# Patient Record
Sex: FEMALE | Race: BLACK OR AFRICAN AMERICAN | NOT HISPANIC OR LATINO
[De-identification: names, ages, dates, MRNs, and addresses within clinical notes are randomized per-mention and may not be internally consistent; named-entity substitution may affect disease eponyms.]

---

## 2017-07-05 ENCOUNTER — APPOINTMENT (OUTPATIENT)
Dept: OBGYN | Facility: CLINIC | Age: 57
End: 2017-07-05

## 2017-07-05 VITALS
DIASTOLIC BLOOD PRESSURE: 87 MMHG | HEART RATE: 88 BPM | BODY MASS INDEX: 33.44 KG/M2 | HEIGHT: 60 IN | SYSTOLIC BLOOD PRESSURE: 131 MMHG | WEIGHT: 170.31 LBS

## 2017-07-05 RX ORDER — ESTRADIOL 0.1 MG/G
0.1 CREAM VAGINAL
Qty: 60 | Refills: 4 | Status: ACTIVE | COMMUNITY
Start: 2017-07-05 | End: 1900-01-01

## 2017-07-05 RX ORDER — HYDROXYZINE HYDROCHLORIDE 50 MG/1
50 TABLET ORAL
Qty: 30 | Refills: 0 | Status: ACTIVE | COMMUNITY
Start: 2017-01-19

## 2017-07-05 RX ORDER — CLOTRIMAZOLE 10 MG/ML
1 SOLUTION TOPICAL
Qty: 30 | Refills: 0 | Status: ACTIVE | COMMUNITY
Start: 2016-07-08

## 2017-07-05 RX ORDER — LOSARTAN POTASSIUM AND HYDROCHLOROTHIAZIDE 12.5; 1 MG/1; MG/1
100-12.5 TABLET ORAL
Qty: 90 | Refills: 0 | Status: ACTIVE | COMMUNITY
Start: 2017-05-11

## 2017-07-05 RX ORDER — GABAPENTIN 600 MG/1
600 TABLET, COATED ORAL
Qty: 30 | Refills: 0 | Status: ACTIVE | COMMUNITY
Start: 2017-05-17

## 2017-07-05 RX ORDER — METHOCARBAMOL 500 MG/1
500 TABLET, FILM COATED ORAL
Qty: 60 | Refills: 0 | Status: ACTIVE | COMMUNITY
Start: 2017-05-17

## 2017-07-05 RX ORDER — PAROXETINE HYDROCHLORIDE 30 MG/1
30 TABLET, FILM COATED ORAL
Qty: 60 | Refills: 0 | Status: ACTIVE | COMMUNITY
Start: 2017-01-19

## 2017-07-05 RX ORDER — NYSTATIN 100000 [USP'U]/G
100000 CREAM TOPICAL
Qty: 60 | Refills: 0 | Status: ACTIVE | COMMUNITY
Start: 2016-12-28

## 2017-07-05 RX ORDER — RISPERIDONE 0.5 MG/1
0.5 TABLET, FILM COATED ORAL
Qty: 30 | Refills: 0 | Status: ACTIVE | COMMUNITY
Start: 2017-05-19

## 2017-07-05 RX ORDER — ZOLPIDEM TARTRATE 10 MG/1
10 TABLET ORAL
Qty: 30 | Refills: 0 | Status: ACTIVE | COMMUNITY
Start: 2017-01-19

## 2017-08-14 ENCOUNTER — OUTPATIENT (OUTPATIENT)
Dept: OUTPATIENT SERVICES | Facility: HOSPITAL | Age: 57
LOS: 1 days | End: 2017-08-14
Payer: COMMERCIAL

## 2017-08-14 VITALS
TEMPERATURE: 98 F | WEIGHT: 170.64 LBS | DIASTOLIC BLOOD PRESSURE: 96 MMHG | HEART RATE: 88 BPM | RESPIRATION RATE: 15 BRPM | SYSTOLIC BLOOD PRESSURE: 130 MMHG

## 2017-08-14 DIAGNOSIS — N62 HYPERTROPHY OF BREAST: ICD-10-CM

## 2017-08-14 DIAGNOSIS — Z98.89 OTHER SPECIFIED POSTPROCEDURAL STATES: Chronic | ICD-10-CM

## 2017-08-14 DIAGNOSIS — N64.0 FISSURE AND FISTULA OF NIPPLE: ICD-10-CM

## 2017-08-14 DIAGNOSIS — Z01.818 ENCOUNTER FOR OTHER PREPROCEDURAL EXAMINATION: ICD-10-CM

## 2017-08-14 DIAGNOSIS — M43.22 FUSION OF SPINE, CERVICAL REGION: Chronic | ICD-10-CM

## 2017-08-14 DIAGNOSIS — Z98.890 OTHER SPECIFIED POSTPROCEDURAL STATES: Chronic | ICD-10-CM

## 2017-08-14 LAB
ANION GAP SERPL CALC-SCNC: 9 MMOL/L — SIGNIFICANT CHANGE UP (ref 5–17)
BUN SERPL-MCNC: 17 MG/DL — SIGNIFICANT CHANGE UP (ref 7–23)
CALCIUM SERPL-MCNC: 9.4 MG/DL — SIGNIFICANT CHANGE UP (ref 8.5–10.1)
CHLORIDE SERPL-SCNC: 101 MMOL/L — SIGNIFICANT CHANGE UP (ref 96–108)
CO2 SERPL-SCNC: 28 MMOL/L — SIGNIFICANT CHANGE UP (ref 22–31)
CREAT SERPL-MCNC: 0.83 MG/DL — SIGNIFICANT CHANGE UP (ref 0.5–1.3)
GLUCOSE SERPL-MCNC: 116 MG/DL — HIGH (ref 70–99)
HBA1C BLD-MCNC: 6 % — HIGH (ref 4–5.6)
HCT VFR BLD CALC: 40.3 % — SIGNIFICANT CHANGE UP (ref 34.5–45)
HGB BLD-MCNC: 13.1 G/DL — SIGNIFICANT CHANGE UP (ref 11.5–15.5)
MCHC RBC-ENTMCNC: 28.2 PG — SIGNIFICANT CHANGE UP (ref 27–34)
MCHC RBC-ENTMCNC: 32.5 GM/DL — SIGNIFICANT CHANGE UP (ref 32–36)
MCV RBC AUTO: 87 FL — SIGNIFICANT CHANGE UP (ref 80–100)
PLATELET # BLD AUTO: 214 K/UL — SIGNIFICANT CHANGE UP (ref 150–400)
POTASSIUM SERPL-MCNC: 4.3 MMOL/L — SIGNIFICANT CHANGE UP (ref 3.5–5.3)
POTASSIUM SERPL-SCNC: 4.3 MMOL/L — SIGNIFICANT CHANGE UP (ref 3.5–5.3)
RBC # BLD: 4.63 M/UL — SIGNIFICANT CHANGE UP (ref 3.8–5.2)
RBC # FLD: 14.7 % — HIGH (ref 10.3–14.5)
SODIUM SERPL-SCNC: 138 MMOL/L — SIGNIFICANT CHANGE UP (ref 135–145)
WBC # BLD: 5.2 K/UL — SIGNIFICANT CHANGE UP (ref 3.8–10.5)
WBC # FLD AUTO: 5.2 K/UL — SIGNIFICANT CHANGE UP (ref 3.8–10.5)

## 2017-08-14 PROCEDURE — 93010 ELECTROCARDIOGRAM REPORT: CPT | Mod: NC

## 2017-08-14 PROCEDURE — 80048 BASIC METABOLIC PNL TOTAL CA: CPT

## 2017-08-14 PROCEDURE — 93005 ELECTROCARDIOGRAM TRACING: CPT

## 2017-08-14 PROCEDURE — 85027 COMPLETE CBC AUTOMATED: CPT

## 2017-08-14 PROCEDURE — G0463: CPT

## 2017-08-14 PROCEDURE — 83036 HEMOGLOBIN GLYCOSYLATED A1C: CPT

## 2017-08-14 NOTE — H&P PST ADULT - PSH
Delivered by  section  x4  Other fusion of spine, cervical region  c5-c7  Status post shoulder surgery  right arthroscopic shoulder repair in 2010

## 2017-08-14 NOTE — H&P PST ADULT - HISTORY OF PRESENT ILLNESS
56 yo female with PMH of HTN DM Anxiety, presented for elective bilateral Keep dressings to bilateral breast reduction due to back pain .

## 2017-08-23 RX ORDER — SODIUM CHLORIDE 9 MG/ML
1000 INJECTION, SOLUTION INTRAVENOUS
Qty: 0 | Refills: 0 | Status: DISCONTINUED | OUTPATIENT
Start: 2017-08-24 | End: 2017-08-24

## 2017-08-24 ENCOUNTER — OUTPATIENT (OUTPATIENT)
Dept: OUTPATIENT SERVICES | Facility: HOSPITAL | Age: 57
LOS: 1 days | Discharge: HOME | End: 2017-08-24
Payer: COMMERCIAL

## 2017-08-24 ENCOUNTER — RESULT REVIEW (OUTPATIENT)
Age: 57
End: 2017-08-24

## 2017-08-24 VITALS
SYSTOLIC BLOOD PRESSURE: 130 MMHG | HEART RATE: 85 BPM | DIASTOLIC BLOOD PRESSURE: 81 MMHG | RESPIRATION RATE: 15 BRPM | WEIGHT: 170.64 LBS | OXYGEN SATURATION: 100 % | TEMPERATURE: 98 F

## 2017-08-24 VITALS
RESPIRATION RATE: 17 BRPM | DIASTOLIC BLOOD PRESSURE: 67 MMHG | SYSTOLIC BLOOD PRESSURE: 106 MMHG | HEART RATE: 99 BPM | OXYGEN SATURATION: 98 %

## 2017-08-24 DIAGNOSIS — N64.0 FISSURE AND FISTULA OF NIPPLE: ICD-10-CM

## 2017-08-24 DIAGNOSIS — Z98.89 OTHER SPECIFIED POSTPROCEDURAL STATES: Chronic | ICD-10-CM

## 2017-08-24 DIAGNOSIS — Z98.890 OTHER SPECIFIED POSTPROCEDURAL STATES: Chronic | ICD-10-CM

## 2017-08-24 DIAGNOSIS — M43.22 FUSION OF SPINE, CERVICAL REGION: Chronic | ICD-10-CM

## 2017-08-24 DIAGNOSIS — N62 HYPERTROPHY OF BREAST: ICD-10-CM

## 2017-08-24 PROCEDURE — 88305 TISSUE EXAM BY PATHOLOGIST: CPT

## 2017-08-24 PROCEDURE — 19318 BREAST REDUCTION: CPT | Mod: AS,50

## 2017-08-24 PROCEDURE — 88305 TISSUE EXAM BY PATHOLOGIST: CPT | Mod: 26

## 2017-08-24 PROCEDURE — 19318 BREAST REDUCTION: CPT | Mod: 50

## 2017-08-24 RX ORDER — ONDANSETRON 8 MG/1
4 TABLET, FILM COATED ORAL ONCE
Qty: 0 | Refills: 0 | Status: DISCONTINUED | OUTPATIENT
Start: 2017-08-24 | End: 2017-08-24

## 2017-08-24 RX ORDER — OXYCODONE HYDROCHLORIDE 5 MG/1
10 TABLET ORAL EVERY 6 HOURS
Qty: 0 | Refills: 0 | Status: DISCONTINUED | OUTPATIENT
Start: 2017-08-24 | End: 2017-08-24

## 2017-08-24 RX ORDER — CEFAZOLIN SODIUM 1 G
2000 VIAL (EA) INJECTION ONCE
Qty: 0 | Refills: 0 | Status: COMPLETED | OUTPATIENT
Start: 2017-08-24 | End: 2017-08-24

## 2017-08-24 RX ORDER — ALPRAZOLAM 0.25 MG
1 TABLET ORAL
Qty: 0 | Refills: 0 | COMMUNITY

## 2017-08-24 RX ORDER — SITAGLIPTIN AND METFORMIN HYDROCHLORIDE 500; 50 MG/1; MG/1
1 TABLET, FILM COATED ORAL
Qty: 0 | Refills: 0 | COMMUNITY

## 2017-08-24 RX ORDER — OXYCODONE HYDROCHLORIDE 5 MG/1
5 TABLET ORAL EVERY 4 HOURS
Qty: 0 | Refills: 0 | Status: DISCONTINUED | OUTPATIENT
Start: 2017-08-24 | End: 2017-08-24

## 2017-08-24 RX ORDER — HYDROMORPHONE HYDROCHLORIDE 2 MG/ML
0.5 INJECTION INTRAMUSCULAR; INTRAVENOUS; SUBCUTANEOUS
Qty: 0 | Refills: 0 | Status: DISCONTINUED | OUTPATIENT
Start: 2017-08-24 | End: 2017-08-24

## 2017-08-24 RX ORDER — AMLODIPINE BESYLATE 2.5 MG/1
1 TABLET ORAL
Qty: 0 | Refills: 0 | COMMUNITY

## 2017-08-24 RX ORDER — SODIUM CHLORIDE 9 MG/ML
1000 INJECTION, SOLUTION INTRAVENOUS
Qty: 0 | Refills: 0 | Status: DISCONTINUED | OUTPATIENT
Start: 2017-08-24 | End: 2017-08-24

## 2017-08-24 RX ADMIN — SODIUM CHLORIDE 75 MILLILITER(S): 9 INJECTION, SOLUTION INTRAVENOUS at 12:06

## 2017-08-24 RX ADMIN — SODIUM CHLORIDE 50 MILLILITER(S): 9 INJECTION, SOLUTION INTRAVENOUS at 07:28

## 2017-08-24 NOTE — ASU DISCHARGE PLAN (ADULT/PEDIATRIC). - MEDICATION SUMMARY - MEDICATIONS TO TAKE
I will START or STAY ON the medications listed below when I get home from the hospital:    Vitamin B 6  -- 1 tab(s) by mouth once a day  -- Indication: For Home med    Percocet 5/325 325 mg-5 mg oral tablet  -- 1 tab(s) by mouth every 6 hours, As Needed MDD:4-6  -- Caution federal law prohibits the transfer of this drug to any person other  than the person for whom it was prescribed.  May cause drowsiness.  Alcohol may intensify this effect.  Use care when operating dangerous machinery.  This prescription cannot be refilled.  This product contains acetaminophen.  Do not use  with any other product containing acetaminophen to prevent possible liver damage.  Using more of this medication than prescribed may cause serious breathing problems.    -- Indication: For pain med    aspirin 81 mg oral delayed release tablet  -- 1 tab(s) by mouth once a day  -- Indication: For Home med    gabapentin 600 mg oral tablet  -- 1 tab(s) by mouth once a day  -- Indication: For Home med    Paxil  -- 60 milligram(s) by mouth once a day  -- Indication: For Home med    Janumet 50 mg-500 mg oral tablet  -- 1 tab(s) by mouth 2 times a day  -- Indication: For Home med    losartan-hydrochlorothiazide 100 mg-25 mg oral tablet  -- 1 tab(s) by mouth once a day  -- Indication: For Home med    Xanax 0.25 mg oral tablet  -- 1 tab(s) by mouth once a day (at bedtime)  -- Indication: For Home med    zolpidem 10 mg oral tablet  -- 1 tab(s) by mouth once a day (at bedtime)  -- Indication: For Home med    Norvasc 10 mg oral tablet  -- 1 tab(s) by mouth once a day  -- Indication: For Home med    Augmentin 875 mg-125 mg oral tablet  -- 1 tab(s) by mouth every 12 hours  -- Finish all this medication unless otherwise directed by prescriber.  Take with food or milk.    -- Indication: For antibiotics

## 2017-08-24 NOTE — ASU DISCHARGE PLAN (ADULT/PEDIATRIC). - INSTRUCTIONS
keep dressing on till seen by MD do not get the dressing wet keep dressing on till seen by MD do not get the dressing wet monitor drain out put and record right and left output.

## 2017-08-24 NOTE — ASU DISCHARGE PLAN (ADULT/PEDIATRIC). - NOTIFY
Excessive Diarrhea/Bleeding that does not stop/Unable to Urinate/Persistent Nausea and Vomiting/Fever greater than 101/Inability to Tolerate Liquids or Foods/Increased Irritability or Sluggishness/Pain not relieved by Medications

## 2017-08-25 LAB — SURGICAL PATHOLOGY FINAL REPORT - CH: SIGNIFICANT CHANGE UP

## 2017-08-27 DIAGNOSIS — E11.9 TYPE 2 DIABETES MELLITUS WITHOUT COMPLICATIONS: ICD-10-CM

## 2017-08-27 DIAGNOSIS — N64.0 FISSURE AND FISTULA OF NIPPLE: ICD-10-CM

## 2017-08-27 DIAGNOSIS — G47.33 OBSTRUCTIVE SLEEP APNEA (ADULT) (PEDIATRIC): ICD-10-CM

## 2017-08-27 DIAGNOSIS — N62 HYPERTROPHY OF BREAST: ICD-10-CM

## 2017-08-27 DIAGNOSIS — I10 ESSENTIAL (PRIMARY) HYPERTENSION: ICD-10-CM

## 2017-08-27 DIAGNOSIS — F41.9 ANXIETY DISORDER, UNSPECIFIED: ICD-10-CM

## 2017-08-28 ENCOUNTER — TRANSCRIPTION ENCOUNTER (OUTPATIENT)
Age: 57
End: 2017-08-28

## 2017-09-07 ENCOUNTER — OUTPATIENT (OUTPATIENT)
Dept: OUTPATIENT SERVICES | Facility: HOSPITAL | Age: 57
LOS: 1 days | End: 2017-09-07
Payer: COMMERCIAL

## 2017-09-07 VITALS
HEART RATE: 82 BPM | RESPIRATION RATE: 20 BRPM | HEIGHT: 60 IN | WEIGHT: 169.98 LBS | SYSTOLIC BLOOD PRESSURE: 120 MMHG | DIASTOLIC BLOOD PRESSURE: 78 MMHG | OXYGEN SATURATION: 98 % | TEMPERATURE: 98 F

## 2017-09-07 DIAGNOSIS — Z98.89 OTHER SPECIFIED POSTPROCEDURAL STATES: Chronic | ICD-10-CM

## 2017-09-07 DIAGNOSIS — Z98.51 TUBAL LIGATION STATUS: Chronic | ICD-10-CM

## 2017-09-07 DIAGNOSIS — S63.501D UNSPECIFIED SPRAIN OF RIGHT WRIST, SUBSEQUENT ENCOUNTER: ICD-10-CM

## 2017-09-07 DIAGNOSIS — G56.01 CARPAL TUNNEL SYNDROME, RIGHT UPPER LIMB: ICD-10-CM

## 2017-09-07 DIAGNOSIS — Z98.890 OTHER SPECIFIED POSTPROCEDURAL STATES: Chronic | ICD-10-CM

## 2017-09-07 DIAGNOSIS — G56.02 CARPAL TUNNEL SYNDROME, LEFT UPPER LIMB: ICD-10-CM

## 2017-09-07 DIAGNOSIS — M43.22 FUSION OF SPINE, CERVICAL REGION: Chronic | ICD-10-CM

## 2017-09-07 DIAGNOSIS — M65.311 TRIGGER THUMB, RIGHT THUMB: ICD-10-CM

## 2017-09-07 DIAGNOSIS — Z01.818 ENCOUNTER FOR OTHER PREPROCEDURAL EXAMINATION: ICD-10-CM

## 2017-09-07 DIAGNOSIS — I10 ESSENTIAL (PRIMARY) HYPERTENSION: ICD-10-CM

## 2017-09-07 DIAGNOSIS — M13.831 OTHER SPECIFIED ARTHRITIS, RIGHT WRIST: ICD-10-CM

## 2017-09-07 DIAGNOSIS — E11.9 TYPE 2 DIABETES MELLITUS WITHOUT COMPLICATIONS: ICD-10-CM

## 2017-09-07 LAB
HCT VFR BLD CALC: 33.4 % — LOW (ref 34.5–45)
HGB BLD-MCNC: 10.8 G/DL — LOW (ref 11.5–15.5)
MCHC RBC-ENTMCNC: 27.1 PG — SIGNIFICANT CHANGE UP (ref 27–34)
MCHC RBC-ENTMCNC: 32.3 GM/DL — SIGNIFICANT CHANGE UP (ref 32–36)
MCV RBC AUTO: 83.7 FL — SIGNIFICANT CHANGE UP (ref 80–100)
PLATELET # BLD AUTO: 390 K/UL — SIGNIFICANT CHANGE UP (ref 150–400)
RBC # BLD: 3.99 M/UL — SIGNIFICANT CHANGE UP (ref 3.8–5.2)
RBC # FLD: 15.8 % — HIGH (ref 10.3–14.5)
WBC # BLD: 8.17 K/UL — SIGNIFICANT CHANGE UP (ref 3.8–10.5)
WBC # FLD AUTO: 8.17 K/UL — SIGNIFICANT CHANGE UP (ref 3.8–10.5)

## 2017-09-07 PROCEDURE — G0463: CPT

## 2017-09-07 PROCEDURE — 85027 COMPLETE CBC AUTOMATED: CPT

## 2017-09-07 RX ORDER — APREPITANT 80 MG/1
40 CAPSULE ORAL ONCE
Qty: 0 | Refills: 0 | Status: COMPLETED | OUTPATIENT
Start: 2017-09-13 | End: 2017-09-13

## 2017-09-07 RX ORDER — SODIUM CHLORIDE 9 MG/ML
3 INJECTION INTRAMUSCULAR; INTRAVENOUS; SUBCUTANEOUS EVERY 8 HOURS
Qty: 0 | Refills: 0 | Status: DISCONTINUED | OUTPATIENT
Start: 2017-09-13 | End: 2017-09-28

## 2017-09-07 RX ORDER — LIDOCAINE HCL 20 MG/ML
0.2 VIAL (ML) INJECTION ONCE
Qty: 0 | Refills: 0 | Status: DISCONTINUED | OUTPATIENT
Start: 2017-09-13 | End: 2017-09-28

## 2017-09-07 RX ORDER — ACETAMINOPHEN 500 MG
975 TABLET ORAL ONCE
Qty: 0 | Refills: 0 | Status: COMPLETED | OUTPATIENT
Start: 2017-09-13 | End: 2017-09-13

## 2017-09-07 NOTE — H&P PST ADULT - ATTENDING COMMENTS
The patient is admitted today for a  left CTR and flexor tenosynovectomy, left trigger thumb release, left wrist arthroscopy/TFCC debridement and left ulnar shortening osteotomy.  I have reviewed the procedure in detail again today with the patient.  The numerous risks, benefits, alternatives, possible complications and expectations are reviewed.  All questions have been thoroughly answered and the patient has verbalized understanding of all of the above and gives consent to proceed

## 2017-09-07 NOTE — H&P PST ADULT - FAMILY HISTORY
Father  Still living? No  Family history of hypertension, Age at diagnosis: Age Unknown     Mother  Still living? Unknown  Family history of hypertension, Age at diagnosis: Age Unknown

## 2017-09-07 NOTE — H&P PST ADULT - HISTORY OF PRESENT ILLNESS
56 yo female with PMH of HTN DM Anxiety, 58 yo female with PMH of HTN,  DM,  Anxiety, Cervical disc disease- s/p cervical fusion 2016, Lumbosacral radiculopathy with unsteady gait, h/o bilateral reduction mammoplasty August 2017, with left wrist pain, paresthesias of fingers for past 2 yrs, Now coming in for left carpal tunnel release and flexor tenosynovectomy left trigger thumb release, Left wrist arthroscopy and ulnar shortening osteotomy on 9/13/17.    Pt was booked as right side- pt confirmed that it is left & pt as a splint in left side of hand. Called OR booking - Kassy and conveyed the site discrepancy .

## 2017-09-07 NOTE — H&P PST ADULT - PROBLEM SELECTOR PLAN 1
Left carpal tunnel release and flexor tenosynovectomy left trigger thumb release, Left wrist arthroscopy and ulnar shortening osteotomy on 9/13/17.

## 2017-09-07 NOTE — H&P PST ADULT - PMH
Anxiety    Carpal tunnel syndrome of left wrist    Cervical disc disease    Depression    Fibromyalgia    HTN (hypertension)    Lumbosacral radiculopathy    Obesity (BMI 30.0-34.9)    Ptosis of eyelid, left    SVT (supraventricular tachycardia)  2015- 1 episode- no interventions- had stress test 2015  Type 2 diabetes mellitus    Unsteady gait  due to back issues Anxiety    Carpal tunnel syndrome of left wrist    Cervical disc disease    Depression    Fibromyalgia    HTN (hypertension)    Lumbosacral radiculopathy    Obesity (BMI 30.0-34.9)    Ptosis of eyelid, left    SVT (supraventricular tachycardia)  2015- 1 episode- no interventions- had stress test 2015- results in sunrise  Type 2 diabetes mellitus    Unsteady gait  due to back issues

## 2017-09-07 NOTE — H&P PST ADULT - PSH
H/O reduction mammoplasty  2017  Other fusion of spine, cervical region  c5-c7- 2016  S/P arthroscopy of shoulder  Right (  )  S/P   x 4  S/P tubal ligation

## 2017-09-07 NOTE — H&P PST ADULT - NSANTHOSAYNRD_GEN_A_CORE
No. JACK screening performed.  STOP BANG Legend: 0-2 = LOW Risk; 3-4 = INTERMEDIATE Risk; 5-8 = HIGH Risk

## 2017-09-13 ENCOUNTER — OUTPATIENT (OUTPATIENT)
Dept: OUTPATIENT SERVICES | Facility: HOSPITAL | Age: 57
LOS: 1 days | End: 2017-09-13
Payer: COMMERCIAL

## 2017-09-13 ENCOUNTER — RESULT REVIEW (OUTPATIENT)
Age: 57
End: 2017-09-13

## 2017-09-13 VITALS
HEART RATE: 91 BPM | SYSTOLIC BLOOD PRESSURE: 133 MMHG | TEMPERATURE: 98 F | DIASTOLIC BLOOD PRESSURE: 85 MMHG | OXYGEN SATURATION: 97 % | RESPIRATION RATE: 16 BRPM

## 2017-09-13 VITALS
OXYGEN SATURATION: 99 % | WEIGHT: 169.98 LBS | HEART RATE: 82 BPM | SYSTOLIC BLOOD PRESSURE: 120 MMHG | DIASTOLIC BLOOD PRESSURE: 79 MMHG | RESPIRATION RATE: 20 BRPM | HEIGHT: 60 IN | TEMPERATURE: 98 F

## 2017-09-13 DIAGNOSIS — G56.01 CARPAL TUNNEL SYNDROME, RIGHT UPPER LIMB: ICD-10-CM

## 2017-09-13 DIAGNOSIS — Z98.89 OTHER SPECIFIED POSTPROCEDURAL STATES: Chronic | ICD-10-CM

## 2017-09-13 DIAGNOSIS — S63.501D UNSPECIFIED SPRAIN OF RIGHT WRIST, SUBSEQUENT ENCOUNTER: ICD-10-CM

## 2017-09-13 DIAGNOSIS — M43.22 FUSION OF SPINE, CERVICAL REGION: Chronic | ICD-10-CM

## 2017-09-13 DIAGNOSIS — M13.831 OTHER SPECIFIED ARTHRITIS, RIGHT WRIST: ICD-10-CM

## 2017-09-13 DIAGNOSIS — Z98.51 TUBAL LIGATION STATUS: Chronic | ICD-10-CM

## 2017-09-13 DIAGNOSIS — Z98.890 OTHER SPECIFIED POSTPROCEDURAL STATES: Chronic | ICD-10-CM

## 2017-09-13 DIAGNOSIS — M65.311 TRIGGER THUMB, RIGHT THUMB: ICD-10-CM

## 2017-09-13 PROCEDURE — 25360 REVISION OF ULNA: CPT | Mod: LT

## 2017-09-13 PROCEDURE — 88305 TISSUE EXAM BY PATHOLOGIST: CPT

## 2017-09-13 PROCEDURE — 26055 INCISE FINGER TENDON SHEATH: CPT | Mod: FA

## 2017-09-13 PROCEDURE — C1889: CPT

## 2017-09-13 PROCEDURE — C1713: CPT

## 2017-09-13 PROCEDURE — 25115 REMOVE WRIST/FOREARM LESION: CPT | Mod: LT

## 2017-09-13 PROCEDURE — 88311 DECALCIFY TISSUE: CPT | Mod: 26

## 2017-09-13 PROCEDURE — 88305 TISSUE EXAM BY PATHOLOGIST: CPT | Mod: 26

## 2017-09-13 PROCEDURE — 29846 WRIST ARTHROSCOPY/SURGERY: CPT | Mod: LT

## 2017-09-13 PROCEDURE — 88311 DECALCIFY TISSUE: CPT

## 2017-09-13 RX ORDER — OXYCODONE HYDROCHLORIDE 5 MG/1
5 TABLET ORAL ONCE
Qty: 0 | Refills: 0 | Status: DISCONTINUED | OUTPATIENT
Start: 2017-09-13 | End: 2017-09-13

## 2017-09-13 RX ORDER — CELECOXIB 200 MG/1
200 CAPSULE ORAL ONCE
Qty: 0 | Refills: 0 | Status: COMPLETED | OUTPATIENT
Start: 2017-09-13 | End: 2017-09-13

## 2017-09-13 RX ORDER — ONDANSETRON 8 MG/1
4 TABLET, FILM COATED ORAL ONCE
Qty: 0 | Refills: 0 | Status: COMPLETED | OUTPATIENT
Start: 2017-09-13 | End: 2017-09-13

## 2017-09-13 RX ORDER — SODIUM CHLORIDE 9 MG/ML
1000 INJECTION, SOLUTION INTRAVENOUS
Qty: 0 | Refills: 0 | Status: DISCONTINUED | OUTPATIENT
Start: 2017-09-13 | End: 2017-09-28

## 2017-09-13 RX ADMIN — ONDANSETRON 4 MILLIGRAM(S): 8 TABLET, FILM COATED ORAL at 15:29

## 2017-09-13 RX ADMIN — OXYCODONE HYDROCHLORIDE 5 MILLIGRAM(S): 5 TABLET ORAL at 15:28

## 2017-09-13 RX ADMIN — APREPITANT 40 MILLIGRAM(S): 80 CAPSULE ORAL at 11:50

## 2017-09-13 RX ADMIN — CELECOXIB 200 MILLIGRAM(S): 200 CAPSULE ORAL at 15:29

## 2017-09-13 RX ADMIN — Medication 975 MILLIGRAM(S): at 11:50

## 2017-09-13 RX ADMIN — CELECOXIB 200 MILLIGRAM(S): 200 CAPSULE ORAL at 11:50

## 2017-09-13 NOTE — PRE-ANESTHESIA EVALUATION ADULT - NSPROPOSEDPROCEDFT_GEN_ALL_CORE
left carpal tunnel release, left thumb trigger, left wrist arthroscopy and ulnar shortening osteotomy

## 2017-09-13 NOTE — ASU DISCHARGE PLAN (ADULT/PEDIATRIC). - NOTIFY
Inability to Tolerate Liquids or Foods/Persistent Nausea and Vomiting/Pain not relieved by Medications/Fever greater than 101/Increased Irritability or Sluggishness

## 2017-09-13 NOTE — ASU DISCHARGE PLAN (ADULT/PEDIATRIC). - ITEMS TO FOLLOWUP WITH YOUR PHYSICIAN'S
Please follow up with Dr. Quiroga within 1-2 weeks after discharge from the hospital. You may call [phone] to schedule an appointment.

## 2017-09-13 NOTE — ASU DISCHARGE PLAN (ADULT/PEDIATRIC). - MEDICATION SUMMARY - MEDICATIONS TO TAKE
I will START or STAY ON the medications listed below when I get home from the hospital:    Vitamin B 6  -- 1 tab(s) by mouth once a day  -- Indication: For Home med    Percocet 5/325 oral tablet  -- 1 tab(s) by mouth every 4 hours, As Needed MDD:6   -- Caution federal law prohibits the transfer of this drug to any person other  than the person for whom it was prescribed.  May cause drowsiness.  Alcohol may intensify this effect.  Use care when operating dangerous machinery.  This prescription cannot be refilled.  This product contains acetaminophen.  Do not use  with any other product containing acetaminophen to prevent possible liver damage.  Using more of this medication than prescribed may cause serious breathing problems.    -- Indication: For Pain    gabapentin 600 mg oral tablet  -- 1 tab(s) by mouth once a day, As Needed - for moderate pain  -- Indication: For Home med    Paxil  -- 60 milligram(s) by mouth once a day  -- Indication: For Home med    Janumet 50 mg-1000 mg oral tablet  -- 1 tab(s) by mouth 2 times a day  -- Indication: For Home med    losartan-hydrochlorothiazide 100 mg-25 mg oral tablet  -- 1 tab(s) by mouth once a day  -- Indication: For Home med    RisperDAL 1 mg oral tablet  -- 1 tab(s) by mouth once a day  -- Indication: For Home med    Xanax 0.25 mg oral tablet  -- 1 tab(s) by mouth once a day (at bedtime), As Needed - for anxiety  -- Indication: For Home med    Norvasc 10 mg oral tablet  -- 1 tab(s) by mouth once a day  -- Indication: For Home med

## 2017-09-14 ENCOUNTER — TRANSCRIPTION ENCOUNTER (OUTPATIENT)
Age: 57
End: 2017-09-14

## 2017-09-19 LAB — SURGICAL PATHOLOGY STUDY: SIGNIFICANT CHANGE UP

## 2017-10-03 ENCOUNTER — APPOINTMENT (OUTPATIENT)
Dept: SURGERY | Facility: CLINIC | Age: 57
End: 2017-10-03

## 2018-05-06 ENCOUNTER — EMERGENCY (EMERGENCY)
Facility: HOSPITAL | Age: 58
LOS: 1 days | End: 2018-05-06
Attending: EMERGENCY MEDICINE
Payer: COMMERCIAL

## 2018-05-06 VITALS
SYSTOLIC BLOOD PRESSURE: 110 MMHG | RESPIRATION RATE: 18 BRPM | OXYGEN SATURATION: 99 % | TEMPERATURE: 98 F | HEART RATE: 85 BPM | DIASTOLIC BLOOD PRESSURE: 73 MMHG

## 2018-05-06 VITALS
DIASTOLIC BLOOD PRESSURE: 92 MMHG | WEIGHT: 169.09 LBS | TEMPERATURE: 98 F | RESPIRATION RATE: 18 BRPM | HEIGHT: 60 IN | OXYGEN SATURATION: 100 % | SYSTOLIC BLOOD PRESSURE: 148 MMHG | HEART RATE: 99 BPM

## 2018-05-06 DIAGNOSIS — Z98.89 OTHER SPECIFIED POSTPROCEDURAL STATES: Chronic | ICD-10-CM

## 2018-05-06 DIAGNOSIS — M43.22 FUSION OF SPINE, CERVICAL REGION: Chronic | ICD-10-CM

## 2018-05-06 DIAGNOSIS — Z98.890 OTHER SPECIFIED POSTPROCEDURAL STATES: Chronic | ICD-10-CM

## 2018-05-06 DIAGNOSIS — Z98.51 TUBAL LIGATION STATUS: Chronic | ICD-10-CM

## 2018-05-06 LAB
ALBUMIN SERPL ELPH-MCNC: 4 G/DL — SIGNIFICANT CHANGE UP (ref 3.3–5)
ALP SERPL-CCNC: 82 U/L — SIGNIFICANT CHANGE UP (ref 40–120)
ALT FLD-CCNC: 27 U/L — SIGNIFICANT CHANGE UP (ref 12–78)
ANION GAP SERPL CALC-SCNC: 10 MMOL/L — SIGNIFICANT CHANGE UP (ref 5–17)
AST SERPL-CCNC: 30 U/L — SIGNIFICANT CHANGE UP (ref 15–37)
BASOPHILS # BLD AUTO: 0.1 K/UL — SIGNIFICANT CHANGE UP (ref 0–0.2)
BASOPHILS NFR BLD AUTO: 1.7 % — SIGNIFICANT CHANGE UP (ref 0–2)
BILIRUB SERPL-MCNC: 0.2 MG/DL — SIGNIFICANT CHANGE UP (ref 0.2–1.2)
BUN SERPL-MCNC: 7 MG/DL — SIGNIFICANT CHANGE UP (ref 7–23)
CALCIUM SERPL-MCNC: 9.4 MG/DL — SIGNIFICANT CHANGE UP (ref 8.5–10.1)
CHLORIDE SERPL-SCNC: 108 MMOL/L — SIGNIFICANT CHANGE UP (ref 96–108)
CK MB BLD-MCNC: 1.2 % — SIGNIFICANT CHANGE UP (ref 0–3.5)
CK MB CFR SERPL CALC: 3.4 NG/ML — SIGNIFICANT CHANGE UP (ref 0–3.6)
CK SERPL-CCNC: 290 U/L — HIGH (ref 26–192)
CO2 SERPL-SCNC: 26 MMOL/L — SIGNIFICANT CHANGE UP (ref 22–31)
CREAT SERPL-MCNC: 0.82 MG/DL — SIGNIFICANT CHANGE UP (ref 0.5–1.3)
D DIMER BLD IA.RAPID-MCNC: 766 NG/ML DDU — HIGH
EOSINOPHIL # BLD AUTO: 0.1 K/UL — SIGNIFICANT CHANGE UP (ref 0–0.5)
EOSINOPHIL NFR BLD AUTO: 1.7 % — SIGNIFICANT CHANGE UP (ref 0–6)
GLUCOSE SERPL-MCNC: 108 MG/DL — HIGH (ref 70–99)
HCT VFR BLD CALC: 41 % — SIGNIFICANT CHANGE UP (ref 34.5–45)
HGB BLD-MCNC: 13.4 G/DL — SIGNIFICANT CHANGE UP (ref 11.5–15.5)
INR BLD: 1 RATIO — SIGNIFICANT CHANGE UP (ref 0.88–1.16)
LYMPHOCYTES # BLD AUTO: 2.2 K/UL — SIGNIFICANT CHANGE UP (ref 1–3.3)
LYMPHOCYTES # BLD AUTO: 38.9 % — SIGNIFICANT CHANGE UP (ref 13–44)
MCHC RBC-ENTMCNC: 26.8 PG — LOW (ref 27–34)
MCHC RBC-ENTMCNC: 32.7 GM/DL — SIGNIFICANT CHANGE UP (ref 32–36)
MCV RBC AUTO: 82.1 FL — SIGNIFICANT CHANGE UP (ref 80–100)
MONOCYTES # BLD AUTO: 0.3 K/UL — SIGNIFICANT CHANGE UP (ref 0–0.9)
MONOCYTES NFR BLD AUTO: 5.7 % — SIGNIFICANT CHANGE UP (ref 1–9)
NEUTROPHILS # BLD AUTO: 2.9 K/UL — SIGNIFICANT CHANGE UP (ref 1.8–7.4)
NEUTROPHILS NFR BLD AUTO: 52 % — SIGNIFICANT CHANGE UP (ref 43–77)
NT-PROBNP SERPL-SCNC: 48 PG/ML — SIGNIFICANT CHANGE UP (ref 0–125)
PLATELET # BLD AUTO: 234 K/UL — SIGNIFICANT CHANGE UP (ref 150–400)
POTASSIUM SERPL-MCNC: 4.1 MMOL/L — SIGNIFICANT CHANGE UP (ref 3.5–5.3)
POTASSIUM SERPL-SCNC: 4.1 MMOL/L — SIGNIFICANT CHANGE UP (ref 3.5–5.3)
PROT SERPL-MCNC: 8.3 G/DL — SIGNIFICANT CHANGE UP (ref 6–8.3)
PROTHROM AB SERPL-ACNC: 10.9 SEC — SIGNIFICANT CHANGE UP (ref 9.8–12.7)
RBC # BLD: 4.99 M/UL — SIGNIFICANT CHANGE UP (ref 3.8–5.2)
RBC # FLD: 14.2 % — SIGNIFICANT CHANGE UP (ref 10.3–14.5)
SODIUM SERPL-SCNC: 144 MMOL/L — SIGNIFICANT CHANGE UP (ref 135–145)
TROPONIN I SERPL-MCNC: <.015 NG/ML — SIGNIFICANT CHANGE UP (ref 0.01–0.04)
WBC # BLD: 5.6 K/UL — SIGNIFICANT CHANGE UP (ref 3.8–10.5)
WBC # FLD AUTO: 5.6 K/UL — SIGNIFICANT CHANGE UP (ref 3.8–10.5)

## 2018-05-06 PROCEDURE — 99285 EMERGENCY DEPT VISIT HI MDM: CPT

## 2018-05-06 PROCEDURE — 70450 CT HEAD/BRAIN W/O DYE: CPT

## 2018-05-06 PROCEDURE — 70450 CT HEAD/BRAIN W/O DYE: CPT | Mod: 26

## 2018-05-06 PROCEDURE — 71275 CT ANGIOGRAPHY CHEST: CPT

## 2018-05-06 PROCEDURE — 96375 TX/PRO/DX INJ NEW DRUG ADDON: CPT

## 2018-05-06 PROCEDURE — 93010 ELECTROCARDIOGRAM REPORT: CPT

## 2018-05-06 PROCEDURE — 82550 ASSAY OF CK (CPK): CPT

## 2018-05-06 PROCEDURE — 99284 EMERGENCY DEPT VISIT MOD MDM: CPT | Mod: 25

## 2018-05-06 PROCEDURE — 96360 HYDRATION IV INFUSION INIT: CPT

## 2018-05-06 PROCEDURE — 82962 GLUCOSE BLOOD TEST: CPT

## 2018-05-06 PROCEDURE — 84484 ASSAY OF TROPONIN QUANT: CPT

## 2018-05-06 PROCEDURE — 93005 ELECTROCARDIOGRAM TRACING: CPT

## 2018-05-06 PROCEDURE — 85379 FIBRIN DEGRADATION QUANT: CPT

## 2018-05-06 PROCEDURE — 83880 ASSAY OF NATRIURETIC PEPTIDE: CPT

## 2018-05-06 PROCEDURE — 71275 CT ANGIOGRAPHY CHEST: CPT | Mod: 26

## 2018-05-06 PROCEDURE — 80053 COMPREHEN METABOLIC PANEL: CPT

## 2018-05-06 PROCEDURE — 82553 CREATINE MB FRACTION: CPT

## 2018-05-06 PROCEDURE — 96361 HYDRATE IV INFUSION ADD-ON: CPT

## 2018-05-06 PROCEDURE — 85610 PROTHROMBIN TIME: CPT

## 2018-05-06 PROCEDURE — 96374 THER/PROPH/DIAG INJ IV PUSH: CPT

## 2018-05-06 PROCEDURE — 85027 COMPLETE CBC AUTOMATED: CPT

## 2018-05-06 RX ORDER — SODIUM CHLORIDE 9 MG/ML
1000 INJECTION INTRAMUSCULAR; INTRAVENOUS; SUBCUTANEOUS ONCE
Qty: 0 | Refills: 0 | Status: COMPLETED | OUTPATIENT
Start: 2018-05-06 | End: 2018-05-06

## 2018-05-06 RX ORDER — GABAPENTIN 400 MG/1
1 CAPSULE ORAL
Qty: 0 | Refills: 0 | COMMUNITY

## 2018-05-06 RX ORDER — RISPERIDONE 4 MG/1
1 TABLET ORAL
Qty: 0 | Refills: 0 | COMMUNITY

## 2018-05-06 RX ORDER — KETOROLAC TROMETHAMINE 30 MG/ML
15 SYRINGE (ML) INJECTION ONCE
Qty: 0 | Refills: 0 | Status: DISCONTINUED | OUTPATIENT
Start: 2018-05-06 | End: 2018-05-06

## 2018-05-06 RX ORDER — ONDANSETRON 8 MG/1
4 TABLET, FILM COATED ORAL ONCE
Qty: 0 | Refills: 0 | Status: COMPLETED | OUTPATIENT
Start: 2018-05-06 | End: 2018-05-06

## 2018-05-06 RX ORDER — ALPRAZOLAM 0.25 MG
0.5 TABLET ORAL ONCE
Qty: 0 | Refills: 0 | Status: DISCONTINUED | OUTPATIENT
Start: 2018-05-06 | End: 2018-05-06

## 2018-05-06 RX ADMIN — Medication 15 MILLIGRAM(S): at 18:22

## 2018-05-06 RX ADMIN — SODIUM CHLORIDE 1000 MILLILITER(S): 9 INJECTION INTRAMUSCULAR; INTRAVENOUS; SUBCUTANEOUS at 17:34

## 2018-05-06 RX ADMIN — Medication 15 MILLIGRAM(S): at 17:35

## 2018-05-06 RX ADMIN — ONDANSETRON 4 MILLIGRAM(S): 8 TABLET, FILM COATED ORAL at 17:34

## 2018-05-06 RX ADMIN — SODIUM CHLORIDE 1000 MILLILITER(S): 9 INJECTION INTRAMUSCULAR; INTRAVENOUS; SUBCUTANEOUS at 18:31

## 2018-05-06 RX ADMIN — Medication 0.5 MILLIGRAM(S): at 17:34

## 2018-05-06 NOTE — ED PROVIDER NOTE - PROGRESS NOTE DETAILS
patient resting comfortably, vitals normal, ekg, labs, ct angio chest no acute findings, to dc home, agrees to f/u with own cardiologist, understands to return to ER if having return of symptoms

## 2018-05-06 NOTE — ED PROVIDER NOTE - MEDICAL DECISION MAKING DETAILS
58 female with syncope, chest discomfort, f/u ekg, labs, ct angio chest r/o PE, orthostatics, pain control, anti-anxiety

## 2018-05-06 NOTE — ED ADULT NURSE NOTE - PMH
Anxiety    Carpal tunnel syndrome of left wrist    Cervical disc disease    Depression    Fibromyalgia    HTN (hypertension)    Lumbosacral radiculopathy    Obesity (BMI 30.0-34.9)    Ptosis of eyelid, left    SVT (supraventricular tachycardia)  2015- 1 episode- no interventions- had stress test 2015- results in sunrise  Type 2 diabetes mellitus    Unsteady gait  due to back issues
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
## 2018-07-16 PROBLEM — I10 ESSENTIAL (PRIMARY) HYPERTENSION: Chronic | Status: INACTIVE | Noted: 2017-08-14 | Resolved: 2017-09-07

## 2018-07-16 PROBLEM — E11.9 TYPE 2 DIABETES MELLITUS WITHOUT COMPLICATIONS: Chronic | Status: INACTIVE | Noted: 2017-08-14 | Resolved: 2017-09-07

## 2019-03-28 PROBLEM — E11.9 TYPE 2 DIABETES MELLITUS WITHOUT COMPLICATIONS: Chronic | Status: ACTIVE | Noted: 2017-09-07

## 2019-03-28 PROBLEM — M50.90 CERVICAL DISC DISORDER, UNSPECIFIED, UNSPECIFIED CERVICAL REGION: Chronic | Status: ACTIVE | Noted: 2017-09-07

## 2019-03-28 PROBLEM — R26.81 UNSTEADINESS ON FEET: Chronic | Status: ACTIVE | Noted: 2017-09-07

## 2019-03-28 PROBLEM — M54.17 RADICULOPATHY, LUMBOSACRAL REGION: Chronic | Status: ACTIVE | Noted: 2017-09-07

## 2019-03-28 PROBLEM — M79.7 FIBROMYALGIA: Chronic | Status: ACTIVE | Noted: 2017-09-07

## 2019-03-28 PROBLEM — H02.402 UNSPECIFIED PTOSIS OF LEFT EYELID: Chronic | Status: ACTIVE | Noted: 2017-09-07

## 2019-03-28 PROBLEM — F41.9 ANXIETY DISORDER, UNSPECIFIED: Chronic | Status: ACTIVE | Noted: 2017-08-14

## 2019-03-28 PROBLEM — G56.02 CARPAL TUNNEL SYNDROME, LEFT UPPER LIMB: Chronic | Status: ACTIVE | Noted: 2017-09-07

## 2019-04-17 ENCOUNTER — APPOINTMENT (OUTPATIENT)
Dept: OBGYN | Facility: CLINIC | Age: 59
End: 2019-04-17
Payer: MEDICARE

## 2019-04-17 VITALS
DIASTOLIC BLOOD PRESSURE: 84 MMHG | HEIGHT: 60 IN | SYSTOLIC BLOOD PRESSURE: 138 MMHG | BODY MASS INDEX: 31.61 KG/M2 | HEART RATE: 86 BPM | WEIGHT: 161 LBS

## 2019-04-17 PROCEDURE — 99396 PREV VISIT EST AGE 40-64: CPT

## 2019-04-17 NOTE — PHYSICAL EXAM
[Awake] : awake [Alert] : alert [Acute Distress] : no acute distress [Mass] : no breast mass [Nipple Discharge] : no nipple discharge [Axillary LAD] : no axillary lymphadenopathy [Soft] : soft [Tender] : non tender [Oriented x3] : oriented to person, place, and time [Atrophy] : atrophy [Normal] : uterus [No Bleeding] : there was no active vaginal bleeding [Normal Position] : in a normal position [Uterine Adnexae] : were not tender and not enlarged
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
## 2019-04-18 LAB — HPV HIGH+LOW RISK DNA PNL CVX: NOT DETECTED

## 2019-04-23 LAB — CYTOLOGY CVX/VAG DOC THIN PREP: NORMAL

## 2020-01-30 NOTE — H&P PST ADULT - NS MD HP INPLANTS MED DEV
Enbrel   Received: Today     LAURENT Brito Nurse Msg Pool             Enbrel SureClick 50mg - Pending Insurance Review     Rx Insurance: [employer]     Reauthorization has been submitted to patient's insurance company via CoverMyMeds.  Will inform provider's office with outcome as soon as insurance makes a determination.         1/24/20   Jannette Bella      
Enbrel   Received: Today     LAURENT Evans Nurse Msg Pool             Enbrel Sureclick 50 mg -  Approved - Awaiting patient call back   Reauthorization     Authorization Number: [id number]   Valid from 1/24/20 to 1/24/21     This was a reauthorization, script is already on file with [facility]     Pharmacy Coverage [employer]   Patient's Co-Pay: $unknown     Co pay Assistance Information   Patient should be enrolled in drug copay card.     Additional Information:   Left message: regarding medication approval.   Let Hardeep know.     Nick Velazquez      
Pharmacy faxed in a request for prior authorization on:    Medication: etanercept (ENBREL SURECLICK)  Dosage: 50 MG/ML auto-injector injection  Quantity requested:  1  Pharmacy for prescription has been selected.    Initiation of prior authorization needed.    
This is a specialty medication, requires a different PA.  Please disregard.    Last OV with Dr. Mendieta 11/6/19, next scheduled appt 8/5/20.  Last labs: CBC, CMP 8/12/19.  Last Enbrel 50 mg weekly refill: 9/25/19 #3.92 mL w/5 refills.    [facility]      PA initiated in Epic.  Will monitor.  
Brain/Spinal implant

## 2020-11-11 ENCOUNTER — APPOINTMENT (OUTPATIENT)
Dept: OBGYN | Facility: CLINIC | Age: 60
End: 2020-11-11
Payer: MEDICARE

## 2020-11-11 VITALS
DIASTOLIC BLOOD PRESSURE: 92 MMHG | HEART RATE: 60 BPM | SYSTOLIC BLOOD PRESSURE: 152 MMHG | WEIGHT: 166.06 LBS | HEIGHT: 60 IN | TEMPERATURE: 98.6 F | BODY MASS INDEX: 32.6 KG/M2

## 2020-11-11 DIAGNOSIS — Z00.00 ENCOUNTER FOR GENERAL ADULT MEDICAL EXAMINATION W/OUT ABNORMAL FINDINGS: ICD-10-CM

## 2020-11-11 PROCEDURE — 99072 ADDL SUPL MATRL&STAF TM PHE: CPT

## 2020-11-11 PROCEDURE — 99213 OFFICE O/P EST LOW 20 MIN: CPT

## 2020-11-11 PROCEDURE — 99396 PREV VISIT EST AGE 40-64: CPT

## 2020-11-11 NOTE — DISCUSSION/SUMMARY
[FreeTextEntry1] : A - WWV\par        Diabetes\par      HTN\par     menopause\par \par P- f/u 1 year\par      pap next due in 2022\par     referral for mammo , Dexa and colonoscopy given \par       exercise encouraged\par      nutritional counseling provided

## 2020-11-11 NOTE — HISTORY OF PRESENT ILLNESS
[FreeTextEntry1] : 60 y.o.  P 4014 postmenopausal female for annual exam. pt has carpal tunnel syndrome, , pt has Depression and is on Effexor and Wellbutrin pt is also Diabetic, most recent A1C was 6.4 pt is on Janumet, , pt also has HTN  on Atenolol, and Norvasc. pt is due for mammo, Dexa,  and colonoscopy.

## 2020-11-11 NOTE — PHYSICAL EXAM
[Appropriately responsive] : appropriately responsive [Alert] : alert [No Acute Distress] : no acute distress [No Lymphadenopathy] : no lymphadenopathy [Regular Rate Rhythm] : regular rate rhythm [No Murmurs] : no murmurs [Clear to Auscultation B/L] : clear to auscultation bilaterally [Soft] : soft [Non-tender] : non-tender [Non-distended] : non-distended [No HSM] : No HSM [No Lesions] : no lesions [No Mass] : no mass [Oriented x3] : oriented x3 [Examination Of The Breasts] : a normal appearance [Breast Reconstruction Right] : breast reconstruction [Breast Reconstruction Left] : breast reconstruction [No Masses] : no breast masses were palpable [Labia Majora] : normal [Labia Minora] : normal [Atrophy] : atrophy [Normal] : normal [Normal Position] : in a normal position [Uterine Adnexae] : normal [FreeTextEntry5] : os stenotic

## 2021-01-05 ENCOUNTER — APPOINTMENT (OUTPATIENT)
Dept: GASTROENTEROLOGY | Facility: CLINIC | Age: 61
End: 2021-01-05
Payer: MEDICARE

## 2021-01-05 VITALS
DIASTOLIC BLOOD PRESSURE: 86 MMHG | SYSTOLIC BLOOD PRESSURE: 128 MMHG | BODY MASS INDEX: 31.22 KG/M2 | HEART RATE: 61 BPM | TEMPERATURE: 98.2 F | WEIGHT: 159 LBS | HEIGHT: 60 IN | OXYGEN SATURATION: 98 %

## 2021-01-05 DIAGNOSIS — G47.33 OBSTRUCTIVE SLEEP APNEA (ADULT) (PEDIATRIC): ICD-10-CM

## 2021-01-05 DIAGNOSIS — E66.9 OBESITY, UNSPECIFIED: ICD-10-CM

## 2021-01-05 PROCEDURE — 99072 ADDL SUPL MATRL&STAF TM PHE: CPT

## 2021-01-05 PROCEDURE — 99203 OFFICE O/P NEW LOW 30 MIN: CPT

## 2021-01-05 RX ORDER — SODIUM PICOSULFATE, MAGNESIUM OXIDE, AND ANHYDROUS CITRIC ACID 10; 3.5; 12 MG/160ML; G/160ML; G/160ML
10-3.5-12 MG-GM LIQUID ORAL
Qty: 320 | Refills: 0 | Status: ACTIVE | COMMUNITY
Start: 2021-01-05 | End: 1900-01-01

## 2021-01-05 NOTE — ASSESSMENT
[FreeTextEntry1] : Impression:\par \par #1 colon cancer screening–rule out colonic neoplasm.  Currently stable from GI standpoint.  As noted, at prior colonoscopy of approximately 4 years ago patient indicates having been told of recommendation for colonoscopy at 3 years.  Unclear if colon polyps were found or other findings to prompt follow-up at this time.  Therefore, follow-up colonoscopy will be scheduled.\par \par #2 chronic constipation–mild chronic constipation consistent with normal transit constipation of functional etiology.\par \par #3 anal pruritus–self-limited and currently resolved.  Normal perianal examination.\par \par #4 obesity–BMI 31.05.\par \par General medical problems:\par \par -Hypertension.\par \par -Diabetes mellitus.\par \par -Status post  section x 4.\par \par -Status post cervical fusion 2016.\par \par -Status post abdominoplasty and breast reduction.\par \par -Status post left hand carpal tunnel repair.

## 2021-01-05 NOTE — PHYSICAL EXAM
[General Appearance - Alert] : alert [General Appearance - In No Acute Distress] : in no acute distress [General Appearance - Well Developed] : well developed [General Appearance - Well-Appearing] : healthy appearing [Sclera] : the sclera and conjunctiva were normal [Neck Appearance] : the appearance of the neck was normal [Neck Cervical Mass (___cm)] : no neck mass was observed [Respiration, Rhythm And Depth] : normal respiratory rhythm and effort [Exaggerated Use Of Accessory Muscles For Inspiration] : no accessory muscle use [Auscultation Breath Sounds / Voice Sounds] : lungs were clear to auscultation bilaterally [Heart Rate And Rhythm] : heart rate was normal and rhythm regular [Heart Sounds] : normal S1 and S2 [Heart Sounds Gallop] : no gallops [Murmurs] : no murmurs [Heart Sounds Pericardial Friction Rub] : no pericardial rub [Edema] : there was no peripheral edema [Bowel Sounds] : normal bowel sounds [Abdomen Soft] : soft [Abdomen Tenderness] : non-tender [] : no hepato-splenomegaly [Abdomen Mass (___ Cm)] : no abdominal mass palpated [Abdomen Hernia] : no hernia was discovered [Cervical Lymph Nodes Enlarged Posterior Bilaterally] : posterior cervical [Cervical Lymph Nodes Enlarged Anterior Bilaterally] : anterior cervical [Supraclavicular Lymph Nodes Enlarged Bilaterally] : supraclavicular [No CVA Tenderness] : no ~M costovertebral angle tenderness [Abnormal Walk] : normal gait [Nail Clubbing] : no clubbing  or cyanosis of the fingernails [Skin Color & Pigmentation] : normal skin color and pigmentation [Oriented To Time, Place, And Person] : oriented to person, place, and time [Impaired Insight] : insight and judgment were intact [Affect] : the affect was normal [Mood] : the mood was normal [Internal Hemorrhoid] : no internal hemorrhoids [External Hemorrhoid] : no external hemorrhoids [FreeTextEntry1] : Normal perianal exam–symmetric anus without tenderness, excoriation, fistulous opening or other abnormality.

## 2021-01-05 NOTE — HISTORY OF PRESENT ILLNESS
[FreeTextEntry1] : Office consultation on 1/5/2021.\par \par The patient is a 60-year-old woman evaluated for consultation in preparation for follow-up colonoscopy.  PMD: Dr. Dejan Haile.\par \par The patient typically has 1 formed bowel movement daily.  On occasion she passes a hard stool.  Of many years duration she utilizes a green tea every other day which she believes contains senna.  Denies recent change in bowel habit or any current complaints of diarrhea.  Has occasional constipation.  Denies rectal bleeding.  The patient did experience mild anal pruritus last week but that was ultimately self-limited and currently resolved.\par \par Appetite and weight are stable.  Denies any complaints of dysphagia, heartburn, nausea, vomiting or abdominal pain.\par \par Patient indicates her last colonoscopy was approximately 4 years ago and she indicates she was recommended by the physician at that time to undergo follow-up examination in 3 years.  Per patient–prior report not available as to findings.  Patient not clear if polyps were detected or what the findings were.\par \par The patient reports no other history of digestive illness except as noted.  Family history of colon cancer is not reported.

## 2021-01-05 NOTE — CONSULT LETTER
[Dear  ___] : Dear  [unfilled], [Consult Letter:] : I had the pleasure of evaluating your patient, [unfilled]. [( Thank you for referring [unfilled] for consultation for _____ )] : Thank you for referring [unfilled] for consultation for [unfilled] [Please see my note below.] : Please see my note below. [Consult Closing:] : Thank you very much for allowing me to participate in the care of this patient.  If you have any questions, please do not hesitate to contact me. [Sincerely,] : Sincerely, [FreeTextEntry2] : Dr. Dejan Haile [FreeTextEntry3] : Eriberto Barnett M.D.\par

## 2021-01-05 NOTE — REVIEW OF SYSTEMS
[As Noted in HPI] : as noted in HPI [Joint Pain] : joint pain [Anxiety] : anxiety [Depression] : depression [Negative] : Heme/Lymph [FreeTextEntry9] : Neck pain and back pain. [de-identified] : Numbness.

## 2021-01-05 NOTE — REASON FOR VISIT
[Consultation] : a consultation visit [FreeTextEntry1] : in preparation for a follow-up colonoscopy.

## 2021-01-19 RX ORDER — SODIUM PICOSULFATE, MAGNESIUM OXIDE, AND ANHYDROUS CITRIC ACID 10; 3.5; 12 MG/160ML; G/160ML; G/160ML
10-3.5-12 MG-GM LIQUID ORAL
Qty: 1 | Refills: 0 | Status: ACTIVE | COMMUNITY
Start: 2021-01-19 | End: 1900-01-01

## 2021-02-24 ENCOUNTER — RESULT REVIEW (OUTPATIENT)
Age: 61
End: 2021-02-24

## 2021-02-24 ENCOUNTER — OUTPATIENT (OUTPATIENT)
Dept: OUTPATIENT SERVICES | Facility: HOSPITAL | Age: 61
LOS: 1 days | End: 2021-02-24
Payer: COMMERCIAL

## 2021-02-24 ENCOUNTER — APPOINTMENT (OUTPATIENT)
Dept: ULTRASOUND IMAGING | Facility: CLINIC | Age: 61
End: 2021-02-24
Payer: MEDICARE

## 2021-02-24 ENCOUNTER — APPOINTMENT (OUTPATIENT)
Dept: MAMMOGRAPHY | Facility: CLINIC | Age: 61
End: 2021-02-24
Payer: MEDICARE

## 2021-02-24 ENCOUNTER — APPOINTMENT (OUTPATIENT)
Dept: RADIOLOGY | Facility: CLINIC | Age: 61
End: 2021-02-24
Payer: MEDICARE

## 2021-02-24 DIAGNOSIS — Z98.89 OTHER SPECIFIED POSTPROCEDURAL STATES: Chronic | ICD-10-CM

## 2021-02-24 DIAGNOSIS — Z98.890 OTHER SPECIFIED POSTPROCEDURAL STATES: Chronic | ICD-10-CM

## 2021-02-24 DIAGNOSIS — Z98.51 TUBAL LIGATION STATUS: Chronic | ICD-10-CM

## 2021-02-24 DIAGNOSIS — Z00.00 ENCOUNTER FOR GENERAL ADULT MEDICAL EXAMINATION WITHOUT ABNORMAL FINDINGS: ICD-10-CM

## 2021-02-24 DIAGNOSIS — M43.22 FUSION OF SPINE, CERVICAL REGION: Chronic | ICD-10-CM

## 2021-02-24 PROCEDURE — 77067 SCR MAMMO BI INCL CAD: CPT

## 2021-02-24 PROCEDURE — 77063 BREAST TOMOSYNTHESIS BI: CPT

## 2021-02-24 PROCEDURE — 76641 ULTRASOUND BREAST COMPLETE: CPT | Mod: 26,50

## 2021-02-24 PROCEDURE — 77063 BREAST TOMOSYNTHESIS BI: CPT | Mod: 26

## 2021-02-24 PROCEDURE — 77067 SCR MAMMO BI INCL CAD: CPT | Mod: 26

## 2021-02-24 PROCEDURE — 77080 DXA BONE DENSITY AXIAL: CPT | Mod: 26

## 2021-02-24 PROCEDURE — 77080 DXA BONE DENSITY AXIAL: CPT

## 2021-02-24 PROCEDURE — 76641 ULTRASOUND BREAST COMPLETE: CPT

## 2021-02-25 DIAGNOSIS — Z01.818 ENCOUNTER FOR OTHER PREPROCEDURAL EXAMINATION: ICD-10-CM

## 2021-02-26 ENCOUNTER — APPOINTMENT (OUTPATIENT)
Dept: DISASTER EMERGENCY | Facility: CLINIC | Age: 61
End: 2021-02-26

## 2021-02-27 LAB — SARS-COV-2 N GENE NPH QL NAA+PROBE: NOT DETECTED

## 2021-03-01 ENCOUNTER — OUTPATIENT (OUTPATIENT)
Dept: OUTPATIENT SERVICES | Facility: HOSPITAL | Age: 61
LOS: 1 days | Discharge: HOME | End: 2021-03-01
Payer: MEDICARE

## 2021-03-01 ENCOUNTER — RESULT REVIEW (OUTPATIENT)
Age: 61
End: 2021-03-01

## 2021-03-01 ENCOUNTER — APPOINTMENT (OUTPATIENT)
Dept: GASTROENTEROLOGY | Facility: HOSPITAL | Age: 61
End: 2021-03-01

## 2021-03-01 VITALS
SYSTOLIC BLOOD PRESSURE: 124 MMHG | DIASTOLIC BLOOD PRESSURE: 84 MMHG | OXYGEN SATURATION: 99 % | HEART RATE: 56 BPM | WEIGHT: 162.04 LBS | TEMPERATURE: 98 F | RESPIRATION RATE: 17 BRPM | HEIGHT: 60 IN

## 2021-03-01 VITALS
HEART RATE: 58 BPM | OXYGEN SATURATION: 99 % | DIASTOLIC BLOOD PRESSURE: 94 MMHG | RESPIRATION RATE: 15 BRPM | SYSTOLIC BLOOD PRESSURE: 165 MMHG

## 2021-03-01 DIAGNOSIS — Z98.89 OTHER SPECIFIED POSTPROCEDURAL STATES: Chronic | ICD-10-CM

## 2021-03-01 DIAGNOSIS — Z98.890 OTHER SPECIFIED POSTPROCEDURAL STATES: Chronic | ICD-10-CM

## 2021-03-01 DIAGNOSIS — M43.22 FUSION OF SPINE, CERVICAL REGION: Chronic | ICD-10-CM

## 2021-03-01 DIAGNOSIS — Z98.51 TUBAL LIGATION STATUS: Chronic | ICD-10-CM

## 2021-03-01 DIAGNOSIS — Z12.11 ENCOUNTER FOR SCREENING FOR MALIGNANT NEOPLASM OF COLON: ICD-10-CM

## 2021-03-01 LAB
GLUCOSE BLDC GLUCOMTR-MCNC: 123 MG/DL — HIGH (ref 70–99)
GLUCOSE BLDC GLUCOMTR-MCNC: 76 MG/DL — SIGNIFICANT CHANGE UP (ref 70–99)

## 2021-03-01 PROCEDURE — 45380 COLONOSCOPY AND BIOPSY: CPT

## 2021-03-01 PROCEDURE — 88305 TISSUE EXAM BY PATHOLOGIST: CPT | Mod: 26

## 2021-03-01 RX ORDER — SITAGLIPTIN AND METFORMIN HYDROCHLORIDE 500; 50 MG/1; MG/1
1 TABLET, FILM COATED ORAL
Qty: 0 | Refills: 0 | DISCHARGE

## 2021-03-01 RX ORDER — TRAZODONE HCL 50 MG
0 TABLET ORAL
Qty: 0 | Refills: 0 | DISCHARGE

## 2021-03-01 RX ORDER — ATENOLOL 25 MG/1
1 TABLET ORAL
Qty: 0 | Refills: 0 | DISCHARGE

## 2021-03-01 RX ORDER — BUPROPION HYDROCHLORIDE 150 MG/1
300 TABLET, EXTENDED RELEASE ORAL
Qty: 0 | Refills: 0 | DISCHARGE

## 2021-03-01 NOTE — ASU PATIENT PROFILE, ADULT - PSH
H/O carpal tunnel repair  ulnar reduction with pins  H/O reduction mammoplasty  2017  Other fusion of spine, cervical region  c5-c7- 2016  S/P arthroscopy of shoulder  Right (  )  S/P   x 4  S/P tubal ligation

## 2021-03-03 LAB — SURGICAL PATHOLOGY STUDY: SIGNIFICANT CHANGE UP

## 2021-03-06 ENCOUNTER — NON-APPOINTMENT (OUTPATIENT)
Age: 61
End: 2021-03-06

## 2021-03-06 DIAGNOSIS — Z12.11 ENCOUNTER FOR SCREENING FOR MALIGNANT NEOPLASM OF COLON: ICD-10-CM

## 2021-04-19 ENCOUNTER — APPOINTMENT (OUTPATIENT)
Dept: OBGYN | Facility: CLINIC | Age: 61
End: 2021-04-19
Payer: MEDICARE

## 2021-04-19 VITALS
HEIGHT: 60 IN | WEIGHT: 166 LBS | SYSTOLIC BLOOD PRESSURE: 126 MMHG | BODY MASS INDEX: 32.59 KG/M2 | HEART RATE: 75 BPM | TEMPERATURE: 97.3 F | DIASTOLIC BLOOD PRESSURE: 82 MMHG

## 2021-04-19 PROCEDURE — 99396 PREV VISIT EST AGE 40-64: CPT

## 2021-04-19 PROCEDURE — 99072 ADDL SUPL MATRL&STAF TM PHE: CPT

## 2021-04-19 NOTE — DISCUSSION/SUMMARY
[FreeTextEntry1] : A - WwV\par       HTN\par      Diabetes\par      Menopause\par      Depressoin\par \par P-- pt continues to be out of work on Workmen's Compensation\par      exercise encouraged\par      pap next due in 2022\par      nutritional counseling provided\par     mammo and Dexa done , 2/24/21 - Birads -2 and normal bone density\par    colonoscopy done 3/1/21, f/u recommended in 5 years. ( 2026). \par    f/u  1 year.

## 2021-04-19 NOTE — PHYSICAL EXAM
[Appropriately responsive] : appropriately responsive [Alert] : alert [No Acute Distress] : no acute distress [No Lymphadenopathy] : no lymphadenopathy [Regular Rate Rhythm] : regular rate rhythm [No Murmurs] : no murmurs [Clear to Auscultation B/L] : clear to auscultation bilaterally [Soft] : soft [Non-tender] : non-tender [Non-distended] : non-distended [No HSM] : No HSM [No Lesions] : no lesions [No Mass] : no mass [Oriented x3] : oriented x3 [Examination Of The Breasts] : a normal appearance [No Masses] : no breast masses were palpable [Labia Majora] : normal [Labia Minora] : normal [Cervical Stenosis] : cervical stenosis [Normal] : normal [Uterine Adnexae] : normal

## 2021-04-19 NOTE — HISTORY OF PRESENT ILLNESS
[FreeTextEntry1] : 61 y.o. P 4014 postmenopausal female for annual exam. pt has HTN and Diabetes and Depression, previously on Atenolol, Norvasc, Janumet, Effexor and Wellbutrin.pt feels well today.

## 2021-05-25 ENCOUNTER — TRANSCRIPTION ENCOUNTER (OUTPATIENT)
Age: 61
End: 2021-05-25

## 2022-04-18 NOTE — ASU PATIENT PROFILE, ADULT - PMH
Order placed and pt informed
Pt requesting Mammo order for her to complete in May. Once order is placed pls send [facility] msge.
Anxiety    Carpal tunnel syndrome of left wrist    Cervical disc disease    Depression    Fibromyalgia    HTN (hypertension)    Lumbosacral radiculopathy    Obesity (BMI 30.0-34.9)    Ptosis of eyelid, left    SVT (supraventricular tachycardia)  2015- 1 episode- no interventions- had stress test 2015- results in sunrise  Type 2 diabetes mellitus    Unsteady gait  due to back issues

## 2022-06-13 ENCOUNTER — APPOINTMENT (OUTPATIENT)
Dept: OBGYN | Facility: CLINIC | Age: 62
End: 2022-06-13
Payer: MEDICARE

## 2022-06-13 VITALS
BODY MASS INDEX: 29.45 KG/M2 | HEART RATE: 74 BPM | DIASTOLIC BLOOD PRESSURE: 92 MMHG | HEIGHT: 60 IN | WEIGHT: 150 LBS | SYSTOLIC BLOOD PRESSURE: 137 MMHG

## 2022-06-13 DIAGNOSIS — E11.9 TYPE 2 DIABETES MELLITUS W/OUT COMPLICATIONS: ICD-10-CM

## 2022-06-13 DIAGNOSIS — Z78.0 ASYMPTOMATIC MENOPAUSAL STATE: ICD-10-CM

## 2022-06-13 PROCEDURE — 99396 PREV VISIT EST AGE 40-64: CPT

## 2022-06-13 NOTE — HISTORY OF PRESENT ILLNESS
[FreeTextEntry1] : 62 y.o. P 4014 postmenopausal female for annual exam. . pt feels well today, , pt denies any vaginal bleeding pt has hx of HTN, NIDDM, and Depression. . pt is due for mammo ,and pap this year. \par pt is now on Janumet, Losartan , Lipitor , Xanax and Ambien \par pt is s/p colonoscopy Mar. 2021, f/u Mar. 2026- NIKITA Barnett\par Dexa Milad. 2021- WNL. \par Mammo/Breast sono Milad.  2021. - Birads-2\par pt is s/p reduction mammoplasty  and abdominoplasty.

## 2022-06-13 NOTE — DISCUSSION/SUMMARY
[FreeTextEntry1] : A - WWV\par       NIDDM\par      HTN\par     menopause \par     Depression\par \par P_ f/u 1 year\par       exercise encouraged\par      nutritional counseling provided\par      pap done\par     Dexa next due in 2024\par     colonoscopy next due in 2026\par    referral for mammo and breast sono given\par     pt is out on Workmen's Comp \par    pt to continue all meds for  HTN, DM, and Depression\par      alll questiions answered,

## 2022-06-15 LAB — HPV HIGH+LOW RISK DNA PNL CVX: DETECTED

## 2022-06-17 LAB — CYTOLOGY CVX/VAG DOC THIN PREP: NORMAL

## 2022-06-29 ENCOUNTER — APPOINTMENT (OUTPATIENT)
Dept: ULTRASOUND IMAGING | Facility: CLINIC | Age: 62
End: 2022-06-29

## 2022-06-29 ENCOUNTER — RESULT REVIEW (OUTPATIENT)
Age: 62
End: 2022-06-29

## 2022-06-29 ENCOUNTER — APPOINTMENT (OUTPATIENT)
Dept: MAMMOGRAPHY | Facility: CLINIC | Age: 62
End: 2022-06-29

## 2022-06-29 ENCOUNTER — OUTPATIENT (OUTPATIENT)
Dept: OUTPATIENT SERVICES | Facility: HOSPITAL | Age: 62
LOS: 1 days | End: 2022-06-29
Payer: MEDICARE

## 2022-06-29 DIAGNOSIS — Z98.890 OTHER SPECIFIED POSTPROCEDURAL STATES: Chronic | ICD-10-CM

## 2022-06-29 DIAGNOSIS — Z98.51 TUBAL LIGATION STATUS: Chronic | ICD-10-CM

## 2022-06-29 DIAGNOSIS — Z98.89 OTHER SPECIFIED POSTPROCEDURAL STATES: Chronic | ICD-10-CM

## 2022-06-29 DIAGNOSIS — Z00.8 ENCOUNTER FOR OTHER GENERAL EXAMINATION: ICD-10-CM

## 2022-06-29 DIAGNOSIS — M43.22 FUSION OF SPINE, CERVICAL REGION: Chronic | ICD-10-CM

## 2022-06-29 DIAGNOSIS — Z78.0 ASYMPTOMATIC MENOPAUSAL STATE: ICD-10-CM

## 2022-06-29 PROCEDURE — 77067 SCR MAMMO BI INCL CAD: CPT | Mod: 26

## 2022-06-29 PROCEDURE — 77063 BREAST TOMOSYNTHESIS BI: CPT

## 2022-06-29 PROCEDURE — 77063 BREAST TOMOSYNTHESIS BI: CPT | Mod: 26

## 2022-06-29 PROCEDURE — 76641 ULTRASOUND BREAST COMPLETE: CPT | Mod: 26,50

## 2022-06-29 PROCEDURE — 76641 ULTRASOUND BREAST COMPLETE: CPT

## 2022-06-29 PROCEDURE — 77067 SCR MAMMO BI INCL CAD: CPT

## 2022-10-11 NOTE — PRE-ANESTHESIA EVALUATION ADULT - LAST CARDIAC ANGIOGRAM/IMAGING
Quality 226: Preventive Care And Screening: Tobacco Use: Screening And Cessation Intervention: Patient screened for tobacco use and is an ex/non-smoker Detail Level: Detailed Quality 110: Preventive Care And Screening: Influenza Immunization: Influenza Immunization Administered during Influenza season Quality 431: Preventive Care And Screening: Unhealthy Alcohol Use - Screening: Patient not identified as an unhealthy alcohol user when screened for unhealthy alcohol use using a systematic screening method none

## 2023-01-18 NOTE — ASU PREOP CHECKLIST - SURGICAL CONSENT
Pre ECT Assessment Note  Psychiatry  1/18/2023                                        Joyce Simental  1957  [id number]      Subjective:     Patient is a 76 Francesca Cook at [facility]. Death Brother         52'S-     Early Death Brother         52'needs

## 2023-08-02 ENCOUNTER — APPOINTMENT (OUTPATIENT)
Dept: GASTROENTEROLOGY | Facility: CLINIC | Age: 63
End: 2023-08-02
Payer: MEDICARE

## 2023-08-02 VITALS
HEIGHT: 59 IN | OXYGEN SATURATION: 98 % | BODY MASS INDEX: 32.05 KG/M2 | WEIGHT: 159 LBS | SYSTOLIC BLOOD PRESSURE: 125 MMHG | HEART RATE: 84 BPM | DIASTOLIC BLOOD PRESSURE: 80 MMHG

## 2023-08-02 DIAGNOSIS — K63.89 OTHER SPECIFIED DISEASES OF INTESTINE: ICD-10-CM

## 2023-08-02 DIAGNOSIS — Z86.010 PERSONAL HISTORY OF COLONIC POLYPS: ICD-10-CM

## 2023-08-02 DIAGNOSIS — K59.09 OTHER CONSTIPATION: ICD-10-CM

## 2023-08-02 DIAGNOSIS — K57.30 DIVERTICULOSIS OF LARGE INTESTINE W/OUT PERFORATION OR ABSCESS W/OUT BLEEDING: ICD-10-CM

## 2023-08-02 DIAGNOSIS — R14.0 ABDOMINAL DISTENSION (GASEOUS): ICD-10-CM

## 2023-08-02 PROCEDURE — 99214 OFFICE O/P EST MOD 30 MIN: CPT

## 2023-08-02 RX ADMIN — Medication 0 MG: at 00:00

## 2023-08-02 RX ADMIN — Medication 0 GM: at 00:00

## 2023-08-02 NOTE — PHYSICAL EXAM
[Alert] : alert [Normal Voice/Communication] : normal voice/communication [No Acute Distress] : no acute distress [Obese (BMI >= 30)] : obese (BMI >= 30) [Sclera] : the sclera and conjunctiva were normal [Normal Appearance] : the appearance of the neck was normal [No Neck Mass] : no neck mass was observed [No Respiratory Distress] : no respiratory distress [No Acc Muscle Use] : no accessory muscle use [Respiration, Rhythm And Depth] : normal respiratory rhythm and effort [Auscultation Breath Sounds / Voice Sounds] : lungs were clear to auscultation bilaterally [Heart Rate And Rhythm] : heart rate was normal and rhythm regular [Normal S1, S2] : normal S1 and S2 [Murmurs] : no murmurs [None] : no edema [Bowel Sounds] : normal bowel sounds [Abdomen Tenderness] : non-tender [No Masses] : no abdominal mass palpated [Abdomen Soft] : soft [] : no hepatosplenomegaly [Cervical Lymph Nodes Enlarged Posterior Bilaterally] : no posterior cervical lymphadenopathy [Supraclavicular Lymph Nodes Enlarged Bilaterally] : no supraclavicular lymphadenopathy [Cervical Lymph Nodes Enlarged Anterior Bilaterally] : no anterior cervical lymphadenopathy [No CVA Tenderness] : no CVA  tenderness [Abnormal Walk] : normal gait [No Clubbing, Cyanosis] : no clubbing or cyanosis of the fingernails [Normal Color / Pigmentation] : normal skin color and pigmentation [Oriented To Time, Place, And Person] : oriented to person, place, and time [Normal Affect] : the affect was normal [Normal Insight/Judgment] : insight and judgment were intact [Normal Mood] : the mood was normal [de-identified] : Healed transverse lower abdominal scar.

## 2023-08-02 NOTE — CONSULT LETTER
[Dear  ___] : Dear  [unfilled], [Consult Letter:] : I had the pleasure of evaluating your patient, [unfilled]. [Please see my note below.] : Please see my note below. [Consult Closing:] : Thank you very much for allowing me to participate in the care of this patient.  If you have any questions, please do not hesitate to contact me. [Sincerely,] : Sincerely, [FreeTextEntry2] : Dr. Dejan Haile [FreeTextEntry3] : Eriberto Barnett M.D.\par

## 2023-08-02 NOTE — ASSESSMENT
[FreeTextEntry1] : Impression:  #1 chronic constipation–associated with abdominal gaseous discomfort.  Increased severity past 3 months (unknown  if related to medications for weight loss but may be contributory).  Describes no urge to have bowel movements–more issue of slow transit constipation and colonic inertia and history less suggestive of defecation disorder.  Current Green tea with senna may be paradoxically exacerbating bowel irregularity–describes "clearing out" following laxative administration and therefore not unexpected she may not feel urge to move bowels for next few days.  #2 history colonic adenomatous polyps–colonoscopy 3/1/2021 noted for removal of a 3 mm proximal ascending colon adenoma and 3 mm mid ascending colon adenoma.  #3 diverticulosis–sigmoid colon and descending colon.  #4 melanosis coli–detected at prior 3/1/2021 colonoscopy consistent with longstanding laxative use.  #5 obesity-BMI 32.11.  General medical problems:  -Hypertension.  -Diabetes mellitus.  -Status post  section x 4.  -Status post cervical fusion 2016.  -Status post abdominoplasty and breast reduction.  -Status post left hand carpal tunnel repair.

## 2023-08-02 NOTE — ADDENDUM
[FreeTextEntry1] : Plan:  #1 chronic constipation and abdominal gaseous discomfort reviewed with patient.  Has experienced longstanding chronic constipation but of increase severity past several months.  Possibly medication related.  #2 lactose-free diet.  Avoid lactose, fructose and sorbitol products.  Avoid foods that could trigger gaseous discomfort.  Written dietary guidelines were provided.  #3 encourage foods that could help facilitate regular bowel movements–prunes and kiwi.  #4 MiraLAX 17 g daily.  Can escalate to 17g twice daily if needed.  #5 patient advised to contact me in 1 month.  If not improved alternate treatment options will be discussed possibly including Linzess, Trulance, Amitiza and Motegrity.  #6 pending clinical course may warrant further assessment including whole GI tract nuclear scan to assess colonic transit.  In addition, will also consider lactulose breath hydrogen testing to assess for SIBO and possible role of regimen of Xifaxan possibly with neomycin.  #7 patient indicates recent normal lab testing by PMD.  Reports requested for review.  #8 surveillance colonoscopy advised for 3/2026.  However, pending clinical course may warrant earlier evaluation.

## 2023-08-02 NOTE — HISTORY OF PRESENT ILLNESS
[FreeTextEntry1] : Office revisit on 8/2/2023.  The patient presents with complaint of increased severity of chronic constipation and bloating.  Previously evaluated for office consultation on 1/5/2021.  INITIAL CONSULTATION NOTE:  The patient is a 60-year-old woman evaluated for consultation in preparation for follow-up colonoscopy.  PMD: Dr. eDjan Haile.  The patient typically has 1 formed bowel movement daily.  On occasion she passes a hard stool.  Of many years duration she utilizes a green tea every other day which she believes contains senna.  Denies recent change in bowel habit or any current complaints of diarrhea.  Has occasional constipation.  Denies rectal bleeding.  The patient did experience mild anal pruritus last week but that was ultimately self-limited and currently resolved.  Appetite and weight are stable.  Denies any complaints of dysphagia, heartburn, nausea, vomiting or abdominal pain.  Patient indicates her last colonoscopy was approximately 4 years ago and she indicates she was recommended by the physician at that time to undergo follow-up examination in 3 years.  Per patient-prior report not available as to findings.  Patient not clear if polyps were detected or what the findings were.  The patient reports no other history of digestive illness except as noted.  Family history of colon cancer is not reported.  CURRENT HISTORY:  COLONOSCOPY 3/1/2021:     -Surveillance colonoscopy was performed on 3/1/2021. Patient has history of colonic polyps. Total colonoscopy was performed to the cecum. A 3 mm proximal ascending colon adenoma and a 3 mm mid ascending colon adenoma were removed. Few diverticula were noted in the sigmoid colon and descending colon. Melanosis coli was noted. The colonoscopy examination was otherwise normal. The patient has been advised to obtain prior colonoscopy report so as to best determine appropriate surveillance interval. If report not available we will recommend surveillance colonoscopy in 5 years.  ORV 8/2/2023:     -Evaluated regarding increased severity of chronic constipation of 3 months duration in association with bloating and abdominal gaseous discomfort.  Longstanding symptoms of constipation reported.  Increase in severity past 3 months.  Symptoms somewhat increased following discontinuation of Osymia which patient had been on the prior year (describes this as some type of medicine for weight loss).  Characterizes constipation as feeling no urge to move her bowels.  Complains of associated postprandial bloating and abdominal discomfort.  For the past several years the patient has utilized Slim trim plus which apparently is a type of green tea with senna.  Previously utilized this intermittently but over the past 3 months utilizing it approximately 3 times per week.  As indicated, without green tea laxative she feels no urge to move her bowels.  Does not describe an issue of feeling the urge to defecate with straining or inability to defecate.  After the green tea she will have an initial passage of a pellet-like scybalous stool followed by a lumpy stool then followed by a formed bowel movement and ultimately a mushy evacuation and the whole process culminates with the passage of 4-5 bowel movements.  Then might not move her bowels for several days prompting her to take the green tea again.  Previously had tried Metamucil without any improvement.  Has not tried MiraLAX or any newer agents such as Linzess or others.                                 -Denies fever.  Appetite stable.  Indicates intentional weight loss approximately 1 year ago of 27 pounds but has regained 15 pounds.  Denies complaints of dysphagia, heartburn, nausea or vomiting.  Denies abdominal pain although, as indicated experiencing bloating and gaseous discomfort.  No diarrhea.  Denies rectal bleeding.  Can occasionally experience borborygmi.                                  -Denies any  or GYN symptoms.  Plan for GYN follow-up later this month.                                  -Indicates recent normal lab test submitted by PMD 6/2023 (Dr. Maxx Rogers).  Of note, previously had been told of elevated calcium but upon repeat including assessment of parathyroid tests she indicates they were normal.                                  -Medications: Vitamin D2, metformin 500 mg, Lipitor, losartan/HCTZ 100/12.5 mg.  Of note, started Ozempic 2 weeks ago.

## 2023-08-02 NOTE — REASON FOR VISIT
[Follow-up] : a follow-up of an existing diagnosis [FreeTextEntry1] : for evaluation of chronic constipation and bloating.

## 2024-04-12 ENCOUNTER — APPOINTMENT (OUTPATIENT)
Dept: PAIN MANAGEMENT | Facility: CLINIC | Age: 64
End: 2024-04-12

## 2024-05-13 ENCOUNTER — APPOINTMENT (OUTPATIENT)
Dept: OBGYN | Facility: CLINIC | Age: 64
End: 2024-05-13
Payer: MEDICARE

## 2024-05-13 ENCOUNTER — ASOB RESULT (OUTPATIENT)
Age: 64
End: 2024-05-13

## 2024-05-13 VITALS
WEIGHT: 145 LBS | DIASTOLIC BLOOD PRESSURE: 93 MMHG | SYSTOLIC BLOOD PRESSURE: 135 MMHG | HEART RATE: 83 BPM | BODY MASS INDEX: 29.23 KG/M2 | HEIGHT: 59 IN

## 2024-05-13 DIAGNOSIS — N93.0 POSTCOITAL AND CONTACT BLEEDING: ICD-10-CM

## 2024-05-13 DIAGNOSIS — N94.10 UNSPECIFIED DYSPAREUNIA: ICD-10-CM

## 2024-05-13 PROCEDURE — 99214 OFFICE O/P EST MOD 30 MIN: CPT

## 2024-05-13 PROCEDURE — 76830 TRANSVAGINAL US NON-OB: CPT

## 2024-05-13 RX ORDER — ESTRADIOL 0.1 MG/G
0.1 CREAM VAGINAL
Qty: 1 | Refills: 7 | Status: ACTIVE | COMMUNITY
Start: 2024-05-13 | End: 1900-01-01

## 2024-05-13 NOTE — DISCUSSION/SUMMARY
[FreeTextEntry1] : A - Deep dyspareunia      postmenopausal atrophic vaginitis     postcoital bleeding   P - will prescribe vaginal E2 nightly x 7, then twice weekly.      pap done      will get sono for EM thickness and view of uterus and adnexae     f/u 1 month for annual exam.   Addendum in house sono by CATHERINE Palma, Endometrium - 2mm  ovaries normal, uterus two small myomata         essentially a non-contributory sono.

## 2024-05-13 NOTE — HISTORY OF PRESENT ILLNESS
[FreeTextEntry1] : pt presents for follow up, pt is postmenopausal . and today reports deep   dyspareunia. with sexual intercourse last week. pt also noticed  blood stain afterwards. . pt is on Losartan, Metformin, and Atorvastatin. pt also has recently   been placed on Ozempic. - Dr. Campuzano.   PCP is Dr. Rey. pt is not on HRT.

## 2024-05-14 LAB — HPV HIGH+LOW RISK DNA PNL CVX: NOT DETECTED

## 2024-05-17 LAB — CYTOLOGY CVX/VAG DOC THIN PREP: ABNORMAL

## 2024-05-17 RX ORDER — METRONIDAZOLE 500 MG/1
500 TABLET ORAL
Qty: 8 | Refills: 0 | Status: ACTIVE | COMMUNITY
Start: 2024-05-17 | End: 1900-01-01

## 2024-06-18 ENCOUNTER — APPOINTMENT (OUTPATIENT)
Dept: OBGYN | Facility: CLINIC | Age: 64
End: 2024-06-18
Payer: MEDICARE

## 2024-06-18 VITALS
SYSTOLIC BLOOD PRESSURE: 151 MMHG | BODY MASS INDEX: 28.43 KG/M2 | DIASTOLIC BLOOD PRESSURE: 101 MMHG | WEIGHT: 141 LBS | HEART RATE: 81 BPM | HEIGHT: 59 IN

## 2024-06-18 VITALS — SYSTOLIC BLOOD PRESSURE: 155 MMHG | DIASTOLIC BLOOD PRESSURE: 97 MMHG

## 2024-06-18 DIAGNOSIS — I10 ESSENTIAL (PRIMARY) HYPERTENSION: ICD-10-CM

## 2024-06-18 DIAGNOSIS — A59.9 TRICHOMONIASIS, UNSPECIFIED: ICD-10-CM

## 2024-06-18 DIAGNOSIS — Z01.419 ENCOUNTER FOR GYNECOLOGICAL EXAMINATION (GENERAL) (ROUTINE) W/OUT ABNORMAL FINDINGS: ICD-10-CM

## 2024-06-18 DIAGNOSIS — N95.2 POSTMENOPAUSAL ATROPHIC VAGINITIS: ICD-10-CM

## 2024-06-18 PROCEDURE — 99397 PER PM REEVAL EST PAT 65+ YR: CPT

## 2024-06-18 PROCEDURE — 99214 OFFICE O/P EST MOD 30 MIN: CPT | Mod: 25

## 2024-06-18 RX ORDER — ESTRADIOL 0.1 MG/G
0.1 CREAM VAGINAL
Qty: 1 | Refills: 4 | Status: ACTIVE | COMMUNITY
Start: 2024-06-18 | End: 1900-01-01

## 2024-06-18 NOTE — DISCUSSION/SUMMARY
[FreeTextEntry1] : A - WWV       postmenopausal atrophic vaginitis      Trichomoniasis - treated     HTN  P- Affirmed ( new Aptima test) done       pap from last month reviewed      referral for mammo/ breast sono and Dexa given     colonoscopy next due in 2026.      exercise encouraged    nutritional counseling provided,     script sent for vaginal E2 for atrophic vaginitis     f/u 1 year.

## 2024-06-18 NOTE — PHYSICAL EXAM
[Chaperone Present] : A chaperone was present in the examining room during all aspects of the physical examination [[id number]] : A chaperone was present during the pelvic exam. [Appropriately responsive] : appropriately responsive [Alert] : alert [No Acute Distress] : no acute distress [No Lymphadenopathy] : no lymphadenopathy [Regular Rate Rhythm] : regular rate rhythm [No Murmurs] : no murmurs [Clear to Auscultation B/L] : clear to auscultation bilaterally [Soft] : soft [Non-tender] : non-tender [Non-distended] : non-distended [No HSM] : No HSM [No Lesions] : no lesions [No Mass] : no mass [Oriented x3] : oriented x3 [Examination Of The Breasts] : a normal appearance [No Masses] : no breast masses were palpable [Labia Majora] : normal [Labia Minora] : normal [Normal] : normal [Uterine Adnexae] : normal [FreeTextEntry2] : rose

## 2024-06-18 NOTE — HISTORY OF PRESENT ILLNESS
[FreeTextEntry1] : 64  y.o.  P 4014 postmenopausal female, presents for annual exam, pt was recently treated for Trichomonas with 2grams of Flagyl.  PMH - HTN- Losartan 100mg  daily, and Lipitor   NIDDM, - Metformin, 1000mg daily,  hx of Depression takes Wellbutrin and  Xanax prn,  and Ambien prn.  sees Psychiatrist  - Dr. Simpson,  PCP- Dr. Rey  pt reports shoulder pain and is on Gabapentin and  Naproxen.  PSHx - left rotator cuff surgery, , reduction mammoplasty, cervical fusion , abdominoplasty  FH- HTN-mother, Diabetes- father,  SH- negative,  GYN hx - Trichomoniasis. 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 last Dexa- Feb. 2021- WNL Last colonoscopy  March 2021 next due in 2026

## 2024-06-19 LAB
BV BACTERIA RRNA VAG QL NAA+PROBE: NOT DETECTED
C GLABRATA RNA VAG QL NAA+PROBE: NOT DETECTED
C TRACH RRNA SPEC QL NAA+PROBE: NOT DETECTED
CANDIDA RRNA VAG QL PROBE: NOT DETECTED
N GONORRHOEA RRNA SPEC QL NAA+PROBE: NOT DETECTED
T VAGINALIS RRNA SPEC QL NAA+PROBE: NOT DETECTED

## 2024-07-19 ENCOUNTER — APPOINTMENT (OUTPATIENT)
Dept: ULTRASOUND IMAGING | Facility: CLINIC | Age: 64
End: 2024-07-19
Payer: MEDICARE

## 2024-07-19 ENCOUNTER — RESULT REVIEW (OUTPATIENT)
Age: 64
End: 2024-07-19

## 2024-07-19 ENCOUNTER — APPOINTMENT (OUTPATIENT)
Dept: MAMMOGRAPHY | Facility: CLINIC | Age: 64
End: 2024-07-19
Payer: MEDICARE

## 2024-07-19 ENCOUNTER — APPOINTMENT (OUTPATIENT)
Dept: RADIOLOGY | Facility: CLINIC | Age: 64
End: 2024-07-19
Payer: MEDICARE

## 2024-07-19 ENCOUNTER — OUTPATIENT (OUTPATIENT)
Dept: OUTPATIENT SERVICES | Facility: HOSPITAL | Age: 64
LOS: 1 days | End: 2024-07-19
Payer: MEDICARE

## 2024-07-19 DIAGNOSIS — Z00.00 ENCOUNTER FOR GENERAL ADULT MEDICAL EXAMINATION WITHOUT ABNORMAL FINDINGS: ICD-10-CM

## 2024-07-19 DIAGNOSIS — M43.22 FUSION OF SPINE, CERVICAL REGION: Chronic | ICD-10-CM

## 2024-07-19 DIAGNOSIS — Z98.890 OTHER SPECIFIED POSTPROCEDURAL STATES: Chronic | ICD-10-CM

## 2024-07-19 DIAGNOSIS — Z98.51 TUBAL LIGATION STATUS: Chronic | ICD-10-CM

## 2024-07-19 DIAGNOSIS — Z98.89 OTHER SPECIFIED POSTPROCEDURAL STATES: Chronic | ICD-10-CM

## 2024-07-19 PROCEDURE — 77080 DXA BONE DENSITY AXIAL: CPT | Mod: 26

## 2024-07-19 PROCEDURE — 77063 BREAST TOMOSYNTHESIS BI: CPT | Mod: 26

## 2024-07-19 PROCEDURE — 77067 SCR MAMMO BI INCL CAD: CPT | Mod: 26

## 2024-07-19 PROCEDURE — 76641 ULTRASOUND BREAST COMPLETE: CPT | Mod: 26,50

## 2024-07-19 PROCEDURE — 76641 ULTRASOUND BREAST COMPLETE: CPT

## 2024-07-19 PROCEDURE — 77067 SCR MAMMO BI INCL CAD: CPT

## 2024-07-19 PROCEDURE — 77080 DXA BONE DENSITY AXIAL: CPT

## 2024-07-19 PROCEDURE — 77063 BREAST TOMOSYNTHESIS BI: CPT

## 2024-08-07 ENCOUNTER — APPOINTMENT (OUTPATIENT)
Dept: PAIN MANAGEMENT | Facility: CLINIC | Age: 64
End: 2024-08-07

## 2024-08-07 PROBLEM — G44.52 NEW DAILY PERSISTENT HEADACHE: Status: ACTIVE | Noted: 2024-08-07

## 2024-08-07 PROCEDURE — 99205 OFFICE O/P NEW HI 60 MIN: CPT

## 2024-08-15 NOTE — HISTORY OF PRESENT ILLNESS
[FreeTextEntry1] : Pt is 65 yo woman who notes that she had history of moderate headaches for much of life (daughter with migraine.)  Had issue in 2021 with falling and hitting head on the left. Then she noted that she was in [place] in 2022 and had pain on left and noted she didn't sweat on her left temple and left side of head. Her eye on the left she notes accident 2016 when she hurt neck and had 4 level of vertical change.  Had surgical fusion on the left side with pin c5-6 (?)   didn't fuse c7 (?) Did notice that she had drooping of eye on the left following the cervical surgery. had to have surgery by ophthalmologist twice on left to correct drooping. Some tearing on left but usually dry eyes. Did have nasal drainage/ congestion in 2022 which lead her referral to Dr. Hernandez who referred her to us. He did ? cauterization of left nostril and felt improvement with that drainage. Some dry mouth. Does have DM and treats with metformin 500mg bid, losartan 120mg, lipitor 10mg, naproxen, xanax prn, wellbutrin, ambien qhs.baby asa slight decrease blink on left. Does have decreased pupil size on left by 1 mm.  [Headache] : headache [Dizziness] : no dizziness [Nausea] : no nausea [Neck Pain] : neck pain [Scalp Tenderness] : scalp tenderness

## 2024-08-15 NOTE — ASSESSMENT
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 TO give trial of inocin Needs ndph work up wiht mri/ mra intra and extra cranial given sympathetic chain disruption.

## 2024-08-15 NOTE — PHYSICAL EXAM
[General Appearance - Alert] : alert [General Appearance - Well Nourished] : well nourished [General Appearance - Well-Appearing] : healthy appearing [Oriented To Time, Place, And Person] : oriented to person, place, and time [Impaired Insight] : insight and judgment were intact [Affect] : the affect was normal [Memory Recent] : recent memory was not impaired [Memory Remote] : remote memory was not impaired [Person] : oriented to person [Place] : oriented to place [Time] : oriented to time [Short Term Intact] : short term memory intact [Remote Intact] : remote memory intact [Registration Intact] : recent registration memory intact [Concentration Intact] : normal concentrating ability [Visual Intact] : visual attention was ~T not ~L decreased [Fluency] : fluency intact [Comprehension] : comprehension intact [Current Events] : adequate knowledge of current events [Past History] : adequate knowledge of personal past history [Vocabulary] : adequate range of vocabulary [Cranial Nerves Oculomotor (III)] : extraocular motion intact [Cranial Nerves Facial (VII)] : face symmetrical [Cranial Nerves Vestibulocochlear (VIII)] : hearing was intact bilaterally [Cranial Nerves Accessory (XI - Cranial And Spinal)] : head turning and shoulder shrug symmetric [Cranial Nerves Hypoglossal (XII)] : there was no tongue deviation with protrusion [No Muscle Atrophy] : normal bulk in all four extremities [Motor Strength Upper Extremities Bilaterally] : strength was normal in both upper extremities [Sensation Tactile Decrease] : light touch was intact [Abnormal Walk] : normal gait [Tremor] : no tremor present [Dysdiadochokinesia Bilaterally] : not present [FreeTextEntry5] : pupil size on left 1 mm smaller but reactive equally [Sclera] : the sclera and conjunctiva were normal [No JAYESH] : no internuclear ophthalmoplegia [Strabismus] : no strabismus was seen [Outer Ear] : the ears and nose were normal in appearance [FreeTextEntry1] : decreased range of motion in all direction [Exaggerated Use Of Accessory Muscles For Inspiration] : no accessory muscle use [Nail Clubbing] : no clubbing  or cyanosis of the fingernails [Involuntary Movements] : no involuntary movements were seen [Skin Color & Pigmentation] : normal skin color and pigmentation [] : no rash

## 2024-08-15 NOTE — REVIEW OF SYSTEMS
[Fever] : no fever [Feeling Poorly] : not feeling poorly [Nosebleeds] : no nosebleeds [Nasal Discharge] : no nasal discharge [Chest Pain] : no chest pain [Palpitations] : no palpitations [Shortness Of Breath] : no shortness of breath [Cough] : no cough [As Noted in HPI] : as noted in HPI [Arthralgias] : arthralgias [Neck Pain] : neck pain [Skin Lesions] : no skin lesions [Skin Wound] : no skin wound [Itching] : no itching [Convulsions] : no convulsions [Fainting] : no fainting [Sleep Disturbances] : sleep disturbances [Muscle Weakness] : no muscle weakness

## 2024-08-15 NOTE — PHYSICAL EXAM
[General Appearance - Alert] : alert [General Appearance - Well-Appearing] : healthy appearing [General Appearance - Well Nourished] : well nourished [Oriented To Time, Place, And Person] : oriented to person, place, and time [Impaired Insight] : insight and judgment were intact [Affect] : the affect was normal [Memory Recent] : recent memory was not impaired [Memory Remote] : remote memory was not impaired [Person] : oriented to person [Place] : oriented to place [Time] : oriented to time [Short Term Intact] : short term memory intact [Remote Intact] : remote memory intact [Registration Intact] : recent registration memory intact [Concentration Intact] : normal concentrating ability [Visual Intact] : visual attention was ~T not ~L decreased [Fluency] : fluency intact [Comprehension] : comprehension intact [Current Events] : adequate knowledge of current events [Past History] : adequate knowledge of personal past history [Vocabulary] : adequate range of vocabulary [Cranial Nerves Oculomotor (III)] : extraocular motion intact [Cranial Nerves Facial (VII)] : face symmetrical [Cranial Nerves Vestibulocochlear (VIII)] : hearing was intact bilaterally [Cranial Nerves Accessory (XI - Cranial And Spinal)] : head turning and shoulder shrug symmetric [Cranial Nerves Hypoglossal (XII)] : there was no tongue deviation with protrusion [No Muscle Atrophy] : normal bulk in all four extremities [Motor Strength Upper Extremities Bilaterally] : strength was normal in both upper extremities [Sensation Tactile Decrease] : light touch was intact [Abnormal Walk] : normal gait [Tremor] : no tremor present [Dysdiadochokinesia Bilaterally] : not present [FreeTextEntry5] : pupil size on left 1 mm smaller but reactive equally [Sclera] : the sclera and conjunctiva were normal [No JAYESH] : no internuclear ophthalmoplegia [Strabismus] : no strabismus was seen [Outer Ear] : the ears and nose were normal in appearance [FreeTextEntry1] : decreased range of motion in all direction [Exaggerated Use Of Accessory Muscles For Inspiration] : no accessory muscle use [Nail Clubbing] : no clubbing  or cyanosis of the fingernails [Involuntary Movements] : no involuntary movements were seen [Skin Color & Pigmentation] : normal skin color and pigmentation [] : no rash

## 2024-08-15 NOTE — HISTORY OF PRESENT ILLNESS
[FreeTextEntry1] : Pt is 63 yo woman who notes that she had history of moderate headaches for much of life (daughter with migraine.)  Had issue in 2021 with falling and hitting head on the left. Then she noted that she was in [place] in 2022 and had pain on left and noted she didn't sweat on her left temple and left side of head. Her eye on the left she notes accident 2016 when she hurt neck and had 4 level of vertical change.  Had surgical fusion on the left side with pin c5-6 (?)   didn't fuse c7 (?) Did notice that she had drooping of eye on the left following the cervical surgery. had to have surgery by ophthalmologist twice on left to correct drooping. Some tearing on left but usually dry eyes. Did have nasal drainage/ congestion in 2022 which lead her referral to Dr. Hernandez who referred her to us. He did ? cauterization of left nostril and felt improvement with that drainage. Some dry mouth. Does have DM and treats with metformin 500mg bid, losartan 120mg, lipitor 10mg, naproxen, xanax prn, wellbutrin, ambien qhs.baby asa slight decrease blink on left. Does have decreased pupil size on left by 1 mm.  [Headache] : headache [Dizziness] : no dizziness [Nausea] : no nausea [Neck Pain] : neck pain [Scalp Tenderness] : scalp tenderness

## 2024-08-19 ENCOUNTER — RESULT REVIEW (OUTPATIENT)
Age: 64
End: 2024-08-19

## 2024-08-22 ENCOUNTER — APPOINTMENT (OUTPATIENT)
Dept: MRI IMAGING | Facility: CLINIC | Age: 64
End: 2024-08-22

## 2024-08-22 PROCEDURE — 70544 MR ANGIOGRAPHY HEAD W/O DYE: CPT | Mod: 26,59

## 2024-08-22 PROCEDURE — 70551 MRI BRAIN STEM W/O DYE: CPT | Mod: 26

## 2024-08-22 PROCEDURE — 70547 MR ANGIOGRAPHY NECK W/O DYE: CPT | Mod: 26

## 2024-08-28 ENCOUNTER — NON-APPOINTMENT (OUTPATIENT)
Age: 64
End: 2024-08-28

## 2024-08-30 ENCOUNTER — NON-APPOINTMENT (OUTPATIENT)
Age: 64
End: 2024-08-30

## 2024-10-15 ENCOUNTER — APPOINTMENT (OUTPATIENT)
Dept: PAIN MANAGEMENT | Facility: CLINIC | Age: 64
End: 2024-10-15
Payer: MEDICARE

## 2024-10-15 VITALS — HEART RATE: 86 BPM | SYSTOLIC BLOOD PRESSURE: 125 MMHG | DIASTOLIC BLOOD PRESSURE: 86 MMHG

## 2024-10-15 PROCEDURE — 99213 OFFICE O/P EST LOW 20 MIN: CPT

## 2024-10-15 RX ORDER — DICLOFENAC SODIUM 50 MG/1
50 TABLET, DELAYED RELEASE ORAL
Qty: 21 | Refills: 0 | Status: ACTIVE | COMMUNITY
Start: 2024-10-15 | End: 1900-01-01

## 2024-11-19 ENCOUNTER — APPOINTMENT (OUTPATIENT)
Dept: MRI IMAGING | Facility: CLINIC | Age: 64
End: 2024-11-19

## 2025-01-04 ENCOUNTER — EMERGENCY (EMERGENCY)
Facility: HOSPITAL | Age: 65
LOS: 1 days | Discharge: HOME | End: 2025-01-04
Attending: STUDENT IN AN ORGANIZED HEALTH CARE EDUCATION/TRAINING PROGRAM | Admitting: STUDENT IN AN ORGANIZED HEALTH CARE EDUCATION/TRAINING PROGRAM
Payer: MEDICARE

## 2025-01-04 VITALS
HEART RATE: 95 BPM | WEIGHT: 145.51 LBS | TEMPERATURE: 98 F | SYSTOLIC BLOOD PRESSURE: 173 MMHG | OXYGEN SATURATION: 97 % | DIASTOLIC BLOOD PRESSURE: 105 MMHG | RESPIRATION RATE: 18 BRPM

## 2025-01-04 DIAGNOSIS — Z98.890 OTHER SPECIFIED POSTPROCEDURAL STATES: Chronic | ICD-10-CM

## 2025-01-04 DIAGNOSIS — Z98.89 OTHER SPECIFIED POSTPROCEDURAL STATES: Chronic | ICD-10-CM

## 2025-01-04 DIAGNOSIS — Z98.51 TUBAL LIGATION STATUS: Chronic | ICD-10-CM

## 2025-01-04 DIAGNOSIS — M43.22 FUSION OF SPINE, CERVICAL REGION: Chronic | ICD-10-CM

## 2025-01-04 PROCEDURE — 93010 ELECTROCARDIOGRAM REPORT: CPT

## 2025-01-04 PROCEDURE — 99284 EMERGENCY DEPT VISIT MOD MDM: CPT

## 2025-01-04 NOTE — ED ADULT TRIAGE NOTE - CHIEF COMPLAINT QUOTE
Pt st" I have pain in rt side of face  numbness and head pain....that started at 3 pm.  My pressure is running high today....I feel dizzy. My vision blurry for a month. I have weakness in both arms today also started at 3pm ." hx DM2, htn, Afib on asprin

## 2025-01-05 VITALS
OXYGEN SATURATION: 100 % | RESPIRATION RATE: 18 BRPM | HEART RATE: 70 BPM | SYSTOLIC BLOOD PRESSURE: 156 MMHG | TEMPERATURE: 98 F | DIASTOLIC BLOOD PRESSURE: 92 MMHG

## 2025-01-05 LAB
ALBUMIN SERPL ELPH-MCNC: 4.4 G/DL — SIGNIFICANT CHANGE UP (ref 3.3–5)
ALP SERPL-CCNC: 72 U/L — SIGNIFICANT CHANGE UP (ref 40–120)
ALT FLD-CCNC: 23 U/L — SIGNIFICANT CHANGE UP (ref 4–33)
ANION GAP SERPL CALC-SCNC: 12 MMOL/L — SIGNIFICANT CHANGE UP (ref 7–14)
APPEARANCE UR: CLEAR — SIGNIFICANT CHANGE UP
AST SERPL-CCNC: 32 U/L — SIGNIFICANT CHANGE UP (ref 4–32)
BASOPHILS # BLD AUTO: 0.03 K/UL — SIGNIFICANT CHANGE UP (ref 0–0.2)
BASOPHILS NFR BLD AUTO: 0.6 % — SIGNIFICANT CHANGE UP (ref 0–2)
BILIRUB SERPL-MCNC: 0.2 MG/DL — SIGNIFICANT CHANGE UP (ref 0.2–1.2)
BILIRUB UR-MCNC: NEGATIVE — SIGNIFICANT CHANGE UP
BUN SERPL-MCNC: 23 MG/DL — SIGNIFICANT CHANGE UP (ref 7–23)
CALCIUM SERPL-MCNC: 10 MG/DL — SIGNIFICANT CHANGE UP (ref 8.4–10.5)
CHLORIDE SERPL-SCNC: 102 MMOL/L — SIGNIFICANT CHANGE UP (ref 98–107)
CO2 SERPL-SCNC: 24 MMOL/L — SIGNIFICANT CHANGE UP (ref 22–31)
COLOR SPEC: YELLOW — SIGNIFICANT CHANGE UP
CREAT SERPL-MCNC: 0.76 MG/DL — SIGNIFICANT CHANGE UP (ref 0.5–1.3)
DIFF PNL FLD: NEGATIVE — SIGNIFICANT CHANGE UP
EGFR: 87 ML/MIN/1.73M2 — SIGNIFICANT CHANGE UP
EOSINOPHIL # BLD AUTO: 0.21 K/UL — SIGNIFICANT CHANGE UP (ref 0–0.5)
EOSINOPHIL NFR BLD AUTO: 4.4 % — SIGNIFICANT CHANGE UP (ref 0–6)
GLUCOSE SERPL-MCNC: 96 MG/DL — SIGNIFICANT CHANGE UP (ref 70–99)
GLUCOSE UR QL: NEGATIVE MG/DL — SIGNIFICANT CHANGE UP
HCT VFR BLD CALC: 39.1 % — SIGNIFICANT CHANGE UP (ref 34.5–45)
HGB BLD-MCNC: 13.1 G/DL — SIGNIFICANT CHANGE UP (ref 11.5–15.5)
IANC: 2.2 K/UL — SIGNIFICANT CHANGE UP (ref 1.8–7.4)
IMM GRANULOCYTES NFR BLD AUTO: 0.2 % — SIGNIFICANT CHANGE UP (ref 0–0.9)
KETONES UR-MCNC: NEGATIVE MG/DL — SIGNIFICANT CHANGE UP
LEUKOCYTE ESTERASE UR-ACNC: NEGATIVE — SIGNIFICANT CHANGE UP
LYMPHOCYTES # BLD AUTO: 1.9 K/UL — SIGNIFICANT CHANGE UP (ref 1–3.3)
LYMPHOCYTES # BLD AUTO: 39.8 % — SIGNIFICANT CHANGE UP (ref 13–44)
MAGNESIUM SERPL-MCNC: 2.1 MG/DL — SIGNIFICANT CHANGE UP (ref 1.6–2.6)
MCHC RBC-ENTMCNC: 28.1 PG — SIGNIFICANT CHANGE UP (ref 27–34)
MCHC RBC-ENTMCNC: 33.5 G/DL — SIGNIFICANT CHANGE UP (ref 32–36)
MCV RBC AUTO: 83.9 FL — SIGNIFICANT CHANGE UP (ref 80–100)
MONOCYTES # BLD AUTO: 0.42 K/UL — SIGNIFICANT CHANGE UP (ref 0–0.9)
MONOCYTES NFR BLD AUTO: 8.8 % — SIGNIFICANT CHANGE UP (ref 2–14)
NEUTROPHILS # BLD AUTO: 2.2 K/UL — SIGNIFICANT CHANGE UP (ref 1.8–7.4)
NEUTROPHILS NFR BLD AUTO: 46.2 % — SIGNIFICANT CHANGE UP (ref 43–77)
NITRITE UR-MCNC: NEGATIVE — SIGNIFICANT CHANGE UP
NRBC # BLD: 0 /100 WBCS — SIGNIFICANT CHANGE UP (ref 0–0)
NRBC # FLD: 0 K/UL — SIGNIFICANT CHANGE UP (ref 0–0)
PH UR: 7 — SIGNIFICANT CHANGE UP (ref 5–8)
PLATELET # BLD AUTO: 219 K/UL — SIGNIFICANT CHANGE UP (ref 150–400)
POTASSIUM SERPL-MCNC: 4.1 MMOL/L — SIGNIFICANT CHANGE UP (ref 3.5–5.3)
POTASSIUM SERPL-SCNC: 4.1 MMOL/L — SIGNIFICANT CHANGE UP (ref 3.5–5.3)
PROT SERPL-MCNC: 7.3 G/DL — SIGNIFICANT CHANGE UP (ref 6–8.3)
PROT UR-MCNC: NEGATIVE MG/DL — SIGNIFICANT CHANGE UP
RBC # BLD: 4.66 M/UL — SIGNIFICANT CHANGE UP (ref 3.8–5.2)
RBC # FLD: 14.3 % — SIGNIFICANT CHANGE UP (ref 10.3–14.5)
SODIUM SERPL-SCNC: 138 MMOL/L — SIGNIFICANT CHANGE UP (ref 135–145)
SP GR SPEC: 1.01 — SIGNIFICANT CHANGE UP (ref 1–1.03)
TROPONIN T, HIGH SENSITIVITY RESULT: <6 NG/L — SIGNIFICANT CHANGE UP
TSH SERPL-MCNC: 1.27 UIU/ML — SIGNIFICANT CHANGE UP (ref 0.27–4.2)
UROBILINOGEN FLD QL: 0.2 MG/DL — SIGNIFICANT CHANGE UP (ref 0.2–1)
WBC # BLD: 4.77 K/UL — SIGNIFICANT CHANGE UP (ref 3.8–10.5)
WBC # FLD AUTO: 4.77 K/UL — SIGNIFICANT CHANGE UP (ref 3.8–10.5)

## 2025-01-05 PROCEDURE — 72125 CT NECK SPINE W/O DYE: CPT | Mod: 26,MC

## 2025-01-05 PROCEDURE — 70450 CT HEAD/BRAIN W/O DYE: CPT | Mod: 26,MC

## 2025-01-05 PROCEDURE — 71046 X-RAY EXAM CHEST 2 VIEWS: CPT | Mod: 26

## 2025-01-05 RX ORDER — ACETAMINOPHEN 80 MG/.8ML
1000 SOLUTION/ DROPS ORAL ONCE
Refills: 0 | Status: COMPLETED | OUTPATIENT
Start: 2025-01-05 | End: 2025-01-05

## 2025-01-05 RX ADMIN — ACETAMINOPHEN 400 MILLIGRAM(S): 80 SOLUTION/ DROPS ORAL at 01:38

## 2025-01-05 NOTE — ED ADULT NURSE NOTE - NSFALLUNIVINTERV_ED_ALL_ED
Bed/Stretcher in lowest position, wheels locked, appropriate side rails in place/Call bell, personal items and telephone in reach/Instruct patient to call for assistance before getting out of bed/chair/stretcher/Non-slip footwear applied when patient is off stretcher/[place] to call system/Physically safe environment - no spills, clutter or unnecessary equipment/Purposeful proactive rounding/Room/bathroom lighting operational, light cord in reach

## 2025-01-05 NOTE — ED PROVIDER NOTE - NSFOLLOWUPINSTRUCTIONS_ED_ALL_ED_FT
- You were seen in the emergency department today for headache.     - Lab and imaging results, if performed, were discussed with you along with your discharge diagnosis.    - Follow up with your Primary care doctor to address your recent blood pressure changes.  Also follow-up with your neurologist concerning for your headaches.    - Return to the ED for any new, worsening, or concerning symptoms to you.    - Continue all prescribed medications.    - Take ibuprofen/tylenol as directed as needed for pain.     - Rest and keep yourself hydrated with fluids.

## 2025-01-05 NOTE — ED PROVIDER NOTE - NSICDXFAMILYHX_GEN_ALL_CORE_FT
FAMILY HISTORY:  Father  Still living? No  Family history of hypertension, Age at diagnosis: Age Unknown    Mother  Still living? Unknown  Family history of hypertension, Age at diagnosis: Age Unknown

## 2025-01-05 NOTE — ED PROVIDER NOTE - PHYSICAL EXAMINATION
General: well appearing, interactive, well nourished, no apparent distress, ncat  HEENT: EOMI, PERRLA, normal mucosa, normal oropharynx, no lesions on the lips or on oral mucosa, normal external ear  Neck: supple, no lymphadenopathy, full range of motion, no nuchal rigidity  CV: RRR, normal S1 and S2 with no murmur, capillary refill less than two seconds, pp 2+   Resp: lungs CTA b/l, good aeration bilaterally, symmetric chest wall   Abd: non-distended, soft, non-tender  : no CVA tenderness  MSK: full range of motion, no cyanosis, no edema, no clubbing, no immobility  Neuro: CN2-12 grossly intact. EOMI. 5/5 strength in UE and LE b/l.  Sensation intact in UE/LE b/l.  No dysdiadochokinesia. Gait nl   Skin: no rashes, skin intact

## 2025-01-05 NOTE — ED PROVIDER NOTE - NSICDXPASTSURGICALHX_GEN_ALL_CORE_FT
PAST SURGICAL HISTORY:  H/O carpal tunnel repair ulnar reduction with pins    H/O reduction mammoplasty 2017    Other fusion of spine, cervical region c5-c7- 2016    S/P arthroscopy of shoulder Right (  )    S/P  x 4    S/P tubal ligation

## 2025-01-05 NOTE — ED PROVIDER NOTE - PATIENT PORTAL LINK FT
You can access the FollowMyHealth Patient Portal offered by [facility] by registering at the following website: [email or web address]. By joining [facility]’s FollowMyHealth portal, you will also be able to view your health information using other applications (apps) compatible with our system.

## 2025-01-05 NOTE — ED PROVIDER NOTE - OBJECTIVE STATEMENT
64-year-old history of diabetes, anxiety chief complaint of right-sided headache and bilateral arm pain left more than right since this evening.  Patient states she is experienced the symptoms before in the past however today they felt more than usual.  Patient has been following with neurologist outpatient for right-sided headache and left facial numbness. Patient had cervical fusion procedure done in 2018 after an accident and has had persistent neurodeficits.  Patient states pain is responsive to NSAIDs. Patient denying pain, nausea, vomiting, fever, chills, urinary changes, stool changes.

## 2025-01-05 NOTE — ED ADULT NURSE NOTE - OBJECTIVE STATEMENT
A&OX4, ambulatory, h/o DM2, Afib on ASA, HTN. Patient is coming to ED in regards to right sided headahce and generalized weakness. Patient states symptoms started around 3 PM which have now resolved, Patient states felt she felt weak and off balance. Patient denies blurred vision, no facial droop or slurred speech noted. awaiting orders will continue to monitor.

## 2025-01-05 NOTE — ED PROVIDER NOTE - ATTENDING CONTRIBUTION TO CARE
Gus Britt DO:  patient seen and evaluated with the resident.  I was present for key portions of the History & Physical, and I agree with the Impression & Plan. 63 yo f pmh htn, hld, dm, pw malaise. Patient reports several days of elevated blood pressure and ha..  Reports systolic in 170s.  Reports usually in 140s and 150s.  Notes that in the last few weeks her brother , requiring her to go to [facility] for the .  Patient is been very sad as a result of this.  Reporting fatigue, malaise, body aches.  Denies F/C, vision changes, paresthesias.    Reports generally weak, contrary to triage note.  Patient arrives HDS well-appearing, PE as noted.  Suspect related to recent traumatic event.  Do not SPECT CAD, ACS, TIA versus CVA.  Labs, imaging, symptom control, reassess.  Patient has not take anything for headache.

## 2025-01-05 NOTE — ED PROVIDER NOTE - CLINICAL SUMMARY MEDICAL DECISION MAKING FREE TEXT BOX
64-year-old history of diabetes, anxiety chief complaint of right-sided headache and bilateral arm pain left more than right since this evening.  PE significant for left face decreased sensation. Differential includes cervical radiculopathy secondary to her prior cervical procedure, TIA, complex migraine will obtain CT scan and labs.

## 2025-01-05 NOTE — ED PROVIDER NOTE - NSICDXPASTMEDICALHX_GEN_ALL_CORE_FT
PAST MEDICAL HISTORY:  Anxiety     Carpal tunnel syndrome of left wrist     Cervical disc disease     Depression     Fibromyalgia     HTN (hypertension)     Lumbosacral radiculopathy     Obesity (BMI 30.0-34.9)     Ptosis of eyelid, left     SVT (supraventricular tachycardia) 2015- 1 episode- no interventions- had stress test 2015- results in sunrise    Type 2 diabetes mellitus     Unsteady gait due to back issues

## 2025-01-17 ENCOUNTER — APPOINTMENT (OUTPATIENT)
Dept: PAIN MANAGEMENT | Facility: CLINIC | Age: 65
End: 2025-01-17
Payer: MEDICARE

## 2025-01-17 VITALS
BODY MASS INDEX: 28.22 KG/M2 | HEIGHT: 59 IN | DIASTOLIC BLOOD PRESSURE: 92 MMHG | SYSTOLIC BLOOD PRESSURE: 149 MMHG | HEART RATE: 71 BPM | WEIGHT: 140 LBS

## 2025-01-17 PROCEDURE — 99213 OFFICE O/P EST LOW 20 MIN: CPT

## 2025-02-06 ENCOUNTER — APPOINTMENT (OUTPATIENT)
Dept: MRI IMAGING | Facility: CLINIC | Age: 65
End: 2025-02-06
Payer: MEDICARE

## 2025-02-06 ENCOUNTER — OUTPATIENT (OUTPATIENT)
Dept: OUTPATIENT SERVICES | Facility: HOSPITAL | Age: 65
LOS: 1 days | End: 2025-02-06
Payer: MEDICARE

## 2025-02-06 DIAGNOSIS — M43.22 FUSION OF SPINE, CERVICAL REGION: Chronic | ICD-10-CM

## 2025-02-06 DIAGNOSIS — Z98.51 TUBAL LIGATION STATUS: Chronic | ICD-10-CM

## 2025-02-06 DIAGNOSIS — Z98.89 OTHER SPECIFIED POSTPROCEDURAL STATES: Chronic | ICD-10-CM

## 2025-02-06 DIAGNOSIS — Z98.890 OTHER SPECIFIED POSTPROCEDURAL STATES: Chronic | ICD-10-CM

## 2025-02-06 DIAGNOSIS — Z00.00 ENCOUNTER FOR GENERAL ADULT MEDICAL EXAMINATION WITHOUT ABNORMAL FINDINGS: ICD-10-CM

## 2025-02-06 PROCEDURE — 73221 MRI JOINT UPR EXTREM W/O DYE: CPT | Mod: 26,LT

## 2025-02-06 PROCEDURE — 73221 MRI JOINT UPR EXTREM W/O DYE: CPT

## 2025-02-07 ENCOUNTER — OUTPATIENT (OUTPATIENT)
Dept: OUTPATIENT SERVICES | Facility: HOSPITAL | Age: 65
LOS: 1 days | End: 2025-02-07
Payer: MEDICARE

## 2025-02-07 ENCOUNTER — APPOINTMENT (OUTPATIENT)
Dept: MRI IMAGING | Facility: CLINIC | Age: 65
End: 2025-02-07
Payer: MEDICARE

## 2025-02-07 DIAGNOSIS — Z98.89 OTHER SPECIFIED POSTPROCEDURAL STATES: Chronic | ICD-10-CM

## 2025-02-07 DIAGNOSIS — M54.16 RADICULOPATHY, LUMBAR REGION: ICD-10-CM

## 2025-02-07 DIAGNOSIS — Z98.890 OTHER SPECIFIED POSTPROCEDURAL STATES: Chronic | ICD-10-CM

## 2025-02-07 DIAGNOSIS — M43.22 FUSION OF SPINE, CERVICAL REGION: Chronic | ICD-10-CM

## 2025-02-07 DIAGNOSIS — Z98.51 TUBAL LIGATION STATUS: Chronic | ICD-10-CM

## 2025-02-07 PROCEDURE — 72148 MRI LUMBAR SPINE W/O DYE: CPT | Mod: 26

## 2025-02-07 PROCEDURE — 72148 MRI LUMBAR SPINE W/O DYE: CPT

## 2025-06-17 NOTE — H&P PST ADULT - NEUROLOGICAL SYMPTOMS
Problem: Pain  Goal: Verbalizes/displays adequate comfort level or baseline comfort level  Outcome: Progressing     Problem: Cognitive:  Goal: Knowledge of wound care  Description: Knowledge of wound care  Outcome: Progressing  Goal: Understands risk factors for wounds  Description: Understands risk factors for wounds  Outcome: Progressing     Problem: Wound:  Goal: Will show signs of wound healing; wound closure and no evidence of infection  Description: Will show signs of wound healing; wound closure and no evidence of infection  Outcome: Progressing     Problem: Arterial:  Goal: Optimize blood flow for wound healing  Description: Optimize blood flow for wound healing  Outcome: Progressing     Problem: Smoking cessation:  Goal: Ability to formulate a plan to maintain a tobacco-free life will be supported  Description: Ability to formulate a plan to maintain a tobacco-free life will be supported  Outcome: Progressing      difficulty walking/weakness/paresthesias

## 2025-07-17 ENCOUNTER — NON-APPOINTMENT (OUTPATIENT)
Age: 65
End: 2025-07-17

## 2025-07-17 ENCOUNTER — APPOINTMENT (OUTPATIENT)
Dept: PAIN MANAGEMENT | Facility: CLINIC | Age: 65
End: 2025-07-17
Payer: MEDICARE

## 2025-07-17 VITALS
BODY MASS INDEX: 28.43 KG/M2 | HEIGHT: 59 IN | WEIGHT: 141 LBS | HEART RATE: 71 BPM | SYSTOLIC BLOOD PRESSURE: 133 MMHG | DIASTOLIC BLOOD PRESSURE: 86 MMHG

## 2025-07-17 PROCEDURE — 99213 OFFICE O/P EST LOW 20 MIN: CPT

## 2025-07-17 RX ORDER — METFORMIN HYDROCHLORIDE 500 MG/1
500 TABLET, COATED ORAL
Refills: 0 | Status: ACTIVE | COMMUNITY
Start: 2025-07-17

## 2025-07-17 RX ORDER — ATORVASTATIN CALCIUM 20 MG/1
20 TABLET, FILM COATED ORAL
Refills: 0 | Status: ACTIVE | COMMUNITY
Start: 2025-07-17